# Patient Record
Sex: FEMALE | Race: ASIAN | NOT HISPANIC OR LATINO | URBAN - METROPOLITAN AREA
[De-identification: names, ages, dates, MRNs, and addresses within clinical notes are randomized per-mention and may not be internally consistent; named-entity substitution may affect disease eponyms.]

---

## 2021-07-09 ENCOUNTER — EMERGENCY (EMERGENCY)
Facility: HOSPITAL | Age: 86
LOS: 1 days | Discharge: HOME | End: 2021-07-09
Attending: EMERGENCY MEDICINE
Payer: MEDICARE

## 2021-07-09 VITALS
WEIGHT: 121.03 LBS | SYSTOLIC BLOOD PRESSURE: 113 MMHG | HEART RATE: 82 BPM | TEMPERATURE: 98 F | RESPIRATION RATE: 16 BRPM | HEIGHT: 59 IN | OXYGEN SATURATION: 96 % | DIASTOLIC BLOOD PRESSURE: 69 MMHG

## 2021-07-09 LAB
ABO RH CONFIRMATION: SIGNIFICANT CHANGE UP
ALBUMIN SERPL ELPH-MCNC: 2.7 G/DL — LOW (ref 3.5–5)
ALP SERPL-CCNC: 128 U/L — HIGH (ref 40–120)
ALT FLD-CCNC: 20 U/L DA — SIGNIFICANT CHANGE UP (ref 10–60)
ANION GAP SERPL CALC-SCNC: 5 MMOL/L — SIGNIFICANT CHANGE UP (ref 5–17)
ANISOCYTOSIS BLD QL: SIGNIFICANT CHANGE UP
APTT BLD: 34.3 SEC — SIGNIFICANT CHANGE UP (ref 27.5–35.5)
AST SERPL-CCNC: 49 U/L — HIGH (ref 10–40)
BASOPHILS # BLD AUTO: 0.07 K/UL — SIGNIFICANT CHANGE UP (ref 0–0.2)
BASOPHILS NFR BLD AUTO: 1.4 % — SIGNIFICANT CHANGE UP (ref 0–2)
BILIRUB SERPL-MCNC: 0.8 MG/DL — SIGNIFICANT CHANGE UP (ref 0.2–1.2)
BITE CELLS BLD QL SMEAR: PRESENT — SIGNIFICANT CHANGE UP
BLD GP AB SCN SERPL QL: SIGNIFICANT CHANGE UP
BUN SERPL-MCNC: 15 MG/DL — SIGNIFICANT CHANGE UP (ref 7–18)
BURR CELLS BLD QL SMEAR: SIGNIFICANT CHANGE UP
CALCIUM SERPL-MCNC: 7.9 MG/DL — LOW (ref 8.4–10.5)
CHLORIDE SERPL-SCNC: 103 MMOL/L — SIGNIFICANT CHANGE UP (ref 96–108)
CO2 SERPL-SCNC: 27 MMOL/L — SIGNIFICANT CHANGE UP (ref 22–31)
CREAT SERPL-MCNC: 1.28 MG/DL — SIGNIFICANT CHANGE UP (ref 0.5–1.3)
ELLIPTOCYTES BLD QL SMEAR: SLIGHT — SIGNIFICANT CHANGE UP
EOSINOPHIL # BLD AUTO: 0.43 K/UL — SIGNIFICANT CHANGE UP (ref 0–0.5)
EOSINOPHIL NFR BLD AUTO: 8.4 % — HIGH (ref 0–6)
GLUCOSE SERPL-MCNC: 106 MG/DL — HIGH (ref 70–99)
HCT VFR BLD CALC: 20.4 % — CRITICAL LOW (ref 34.5–45)
HGB BLD-MCNC: 5.8 G/DL — CRITICAL LOW (ref 11.5–15.5)
HYPOCHROMIA BLD QL: SIGNIFICANT CHANGE UP
IMM GRANULOCYTES NFR BLD AUTO: 0.2 % — SIGNIFICANT CHANGE UP (ref 0–1.5)
INR BLD: 1.22 RATIO — HIGH (ref 0.88–1.16)
LYMPHOCYTES # BLD AUTO: 1.37 K/UL — SIGNIFICANT CHANGE UP (ref 1–3.3)
LYMPHOCYTES # BLD AUTO: 26.8 % — SIGNIFICANT CHANGE UP (ref 13–44)
MACROCYTES BLD QL: SLIGHT — SIGNIFICANT CHANGE UP
MAGNESIUM SERPL-MCNC: 2.5 MG/DL — SIGNIFICANT CHANGE UP (ref 1.6–2.6)
MANUAL SMEAR VERIFICATION: SIGNIFICANT CHANGE UP
MCHC RBC-ENTMCNC: 17 PG — LOW (ref 27–34)
MCHC RBC-ENTMCNC: 28.4 GM/DL — LOW (ref 32–36)
MCV RBC AUTO: 59.8 FL — LOW (ref 80–100)
MICROCYTES BLD QL: SLIGHT — SIGNIFICANT CHANGE UP
MONOCYTES # BLD AUTO: 0.71 K/UL — SIGNIFICANT CHANGE UP (ref 0–0.9)
MONOCYTES NFR BLD AUTO: 13.9 % — SIGNIFICANT CHANGE UP (ref 2–14)
NEUTROPHILS # BLD AUTO: 2.53 K/UL — SIGNIFICANT CHANGE UP (ref 1.8–7.4)
NEUTROPHILS NFR BLD AUTO: 49.3 % — SIGNIFICANT CHANGE UP (ref 43–77)
NRBC # BLD: 0 /100 WBCS — SIGNIFICANT CHANGE UP (ref 0–0)
NT-PROBNP SERPL-SCNC: 622 PG/ML — HIGH (ref 0–450)
OB PNL STL: POSITIVE
OVALOCYTES BLD QL SMEAR: SLIGHT — SIGNIFICANT CHANGE UP
PLAT MORPH BLD: NORMAL — SIGNIFICANT CHANGE UP
PLATELET # BLD AUTO: 223 K/UL — SIGNIFICANT CHANGE UP (ref 150–400)
POIKILOCYTOSIS BLD QL AUTO: SIGNIFICANT CHANGE UP
POTASSIUM SERPL-MCNC: 4.7 MMOL/L — SIGNIFICANT CHANGE UP (ref 3.5–5.3)
POTASSIUM SERPL-SCNC: 4.7 MMOL/L — SIGNIFICANT CHANGE UP (ref 3.5–5.3)
PROT SERPL-MCNC: 6.8 G/DL — SIGNIFICANT CHANGE UP (ref 6–8.3)
PROTHROM AB SERPL-ACNC: 14.4 SEC — HIGH (ref 10.6–13.6)
RBC # BLD: 3.41 M/UL — LOW (ref 3.8–5.2)
RBC # FLD: 21.4 % — HIGH (ref 10.3–14.5)
RBC BLD AUTO: ABNORMAL
SCHISTOCYTES BLD QL AUTO: SLIGHT — SIGNIFICANT CHANGE UP
SODIUM SERPL-SCNC: 135 MMOL/L — SIGNIFICANT CHANGE UP (ref 135–145)
STOMATOCYTES BLD QL SMEAR: SLIGHT — SIGNIFICANT CHANGE UP
TARGETS BLD QL SMEAR: SLIGHT — SIGNIFICANT CHANGE UP
WBC # BLD: 5.12 K/UL — SIGNIFICANT CHANGE UP (ref 3.8–10.5)
WBC # FLD AUTO: 5.12 K/UL — SIGNIFICANT CHANGE UP (ref 3.8–10.5)

## 2021-07-09 PROCEDURE — 85025 COMPLETE CBC W/AUTO DIFF WBC: CPT

## 2021-07-09 PROCEDURE — 83880 ASSAY OF NATRIURETIC PEPTIDE: CPT

## 2021-07-09 PROCEDURE — 85730 THROMBOPLASTIN TIME PARTIAL: CPT

## 2021-07-09 PROCEDURE — 86850 RBC ANTIBODY SCREEN: CPT

## 2021-07-09 PROCEDURE — 85610 PROTHROMBIN TIME: CPT

## 2021-07-09 PROCEDURE — 86923 COMPATIBILITY TEST ELECTRIC: CPT

## 2021-07-09 PROCEDURE — 71045 X-RAY EXAM CHEST 1 VIEW: CPT

## 2021-07-09 PROCEDURE — 86900 BLOOD TYPING SEROLOGIC ABO: CPT

## 2021-07-09 PROCEDURE — 71045 X-RAY EXAM CHEST 1 VIEW: CPT | Mod: 26

## 2021-07-09 PROCEDURE — 86901 BLOOD TYPING SEROLOGIC RH(D): CPT

## 2021-07-09 PROCEDURE — 93005 ELECTROCARDIOGRAM TRACING: CPT

## 2021-07-09 PROCEDURE — P9040: CPT

## 2021-07-09 PROCEDURE — 36415 COLL VENOUS BLD VENIPUNCTURE: CPT

## 2021-07-09 PROCEDURE — 36430 TRANSFUSION BLD/BLD COMPNT: CPT

## 2021-07-09 PROCEDURE — 83735 ASSAY OF MAGNESIUM: CPT

## 2021-07-09 PROCEDURE — 96374 THER/PROPH/DIAG INJ IV PUSH: CPT

## 2021-07-09 PROCEDURE — 99284 EMERGENCY DEPT VISIT MOD MDM: CPT | Mod: 25

## 2021-07-09 PROCEDURE — 82272 OCCULT BLD FECES 1-3 TESTS: CPT

## 2021-07-09 PROCEDURE — 80053 COMPREHEN METABOLIC PANEL: CPT

## 2021-07-09 PROCEDURE — 99285 EMERGENCY DEPT VISIT HI MDM: CPT

## 2021-07-09 RX ORDER — FUROSEMIDE 40 MG
40 TABLET ORAL ONCE
Refills: 0 | Status: COMPLETED | OUTPATIENT
Start: 2021-07-09 | End: 2021-07-09

## 2021-07-09 RX ADMIN — Medication 40 MILLIGRAM(S): at 21:21

## 2021-07-09 NOTE — ED PROVIDER NOTE - ENMT, MLM
Airway patent, Nasal mucosa clear. Mouth with normal mucosa. Throat has no vesicles, no oropharyngeal exudates and uvula is midline.  [place]

## 2021-07-09 NOTE — ED PROVIDER NOTE - PROGRESS NOTE DETAILS
pt s/p 2 units blood transfusion.  Case d/w Dr. Matthews, even though pt with guaiac positive, 2/2 age, no further w/u warranted.  Will d/c back to NH pt suddenly with coughing, mild wheezing, given neb treatment.  wheezing much improved.  Pt's d/c to NH

## 2021-07-09 NOTE — ED PROVIDER NOTE - PMH
COPD (chronic obstructive pulmonary disease)    Heart failure    HTN (hypertension)    Hypothyroid

## 2021-07-09 NOTE — ED ADULT TRIAGE NOTE - RESPIRATORY RATE (BREATHS/MIN)
16 Solaraze Pregnancy And Lactation Text: This medication is Pregnancy Category B and is considered safe. There is some data to suggest avoiding during the third trimester. It is unknown if this medication is excreted in breast milk.

## 2021-07-09 NOTE — ED PROVIDER NOTE - OBJECTIVE STATEMENT
89 y.o. NH female BIBA reportedly with Hgb 5.8 89 y.o. NH female BIBA reportedly with Hgb 5.6, Pt's able to ambulate with a walker, c/o feeling tired, myalgia, mild dec. appetite, leg edema,sob for a while, denies fever, coughing, cp.  Pt completed COVID vaccine.

## 2021-07-09 NOTE — ED ADULT NURSE NOTE - NSIMPLEMENTINTERV_GEN_ALL_ED
Implemented All Fall with Harm Risk Interventions:  [place] to call system. Call bell, personal items and telephone within reach. Instruct patient to call for assistance. Room bathroom lighting operational. Non-slip footwear when patient is off stretcher. Physically safe environment: no spills, clutter or unnecessary equipment. Stretcher in lowest position, wheels locked, appropriate side rails in place. Provide visual cue, wrist band, yellow gown, etc. Monitor gait and stability. Monitor for mental status changes and reorient to person, place, and time. Review medications for side effects contributing to fall risk. Reinforce activity limits and safety measures with patient and family. Provide visual clues: red socks.

## 2021-07-09 NOTE — ED PROVIDER NOTE - PATIENT PORTAL LINK FT
You can access the FollowMyHealth Patient Portal offered by [facility] by registering at the following website: [email or web address]. By joining [employer]’s FollowMyHealth portal, you will also be able to view your health information using other applications (apps) compatible with our system.

## 2021-07-10 VITALS
RESPIRATION RATE: 18 BRPM | TEMPERATURE: 98 F | OXYGEN SATURATION: 97 % | DIASTOLIC BLOOD PRESSURE: 63 MMHG | HEART RATE: 80 BPM | SYSTOLIC BLOOD PRESSURE: 115 MMHG

## 2021-07-10 RX ORDER — IPRATROPIUM/ALBUTEROL SULFATE 18-103MCG
3 AEROSOL WITH ADAPTER (GRAM) INHALATION
Refills: 0 | Status: DISCONTINUED | OUTPATIENT
Start: 2021-07-10 | End: 2021-07-13

## 2022-01-28 ENCOUNTER — INPATIENT (INPATIENT)
Facility: HOSPITAL | Age: 87
LOS: 4 days | Discharge: NURSING, REHAB OR LONG-TERM CARE | DRG: 522 | End: 2022-02-02
Attending: INTERNAL MEDICINE | Admitting: INTERNAL MEDICINE
Payer: MEDICARE

## 2022-01-28 VITALS
HEIGHT: 59 IN | HEART RATE: 74 BPM | DIASTOLIC BLOOD PRESSURE: 67 MMHG | SYSTOLIC BLOOD PRESSURE: 118 MMHG | OXYGEN SATURATION: 100 % | RESPIRATION RATE: 16 BRPM | TEMPERATURE: 98 F | WEIGHT: 119.93 LBS

## 2022-01-28 DIAGNOSIS — I50.9 HEART FAILURE, UNSPECIFIED: ICD-10-CM

## 2022-01-28 DIAGNOSIS — D64.9 ANEMIA, UNSPECIFIED: ICD-10-CM

## 2022-01-28 DIAGNOSIS — Z29.9 ENCOUNTER FOR PROPHYLACTIC MEASURES, UNSPECIFIED: ICD-10-CM

## 2022-01-28 DIAGNOSIS — S72.002A FRACTURE OF UNSPECIFIED PART OF NECK OF LEFT FEMUR, INITIAL ENCOUNTER FOR CLOSED FRACTURE: ICD-10-CM

## 2022-01-28 DIAGNOSIS — E11.9 TYPE 2 DIABETES MELLITUS WITHOUT COMPLICATIONS: ICD-10-CM

## 2022-01-28 DIAGNOSIS — M25.552 PAIN IN LEFT HIP: ICD-10-CM

## 2022-01-28 DIAGNOSIS — Z51.5 ENCOUNTER FOR PALLIATIVE CARE: ICD-10-CM

## 2022-01-28 DIAGNOSIS — E03.9 HYPOTHYROIDISM, UNSPECIFIED: ICD-10-CM

## 2022-01-28 DIAGNOSIS — I10 ESSENTIAL (PRIMARY) HYPERTENSION: ICD-10-CM

## 2022-01-28 PROBLEM — J44.9 CHRONIC OBSTRUCTIVE PULMONARY DISEASE, UNSPECIFIED: Chronic | Status: ACTIVE | Noted: 2021-07-09

## 2022-01-28 LAB
ALBUMIN SERPL ELPH-MCNC: 2.1 G/DL — LOW (ref 3.5–5)
ALBUMIN SERPL ELPH-MCNC: 2.5 G/DL — LOW (ref 3.5–5)
ALP SERPL-CCNC: 115 U/L — SIGNIFICANT CHANGE UP (ref 40–120)
ALP SERPL-CCNC: 117 U/L — SIGNIFICANT CHANGE UP (ref 40–120)
ALT FLD-CCNC: 25 U/L DA — SIGNIFICANT CHANGE UP (ref 10–60)
ALT FLD-CCNC: 26 U/L DA — SIGNIFICANT CHANGE UP (ref 10–60)
ANION GAP SERPL CALC-SCNC: 4 MMOL/L — LOW (ref 5–17)
ANION GAP SERPL CALC-SCNC: 4 MMOL/L — LOW (ref 5–17)
APPEARANCE UR: CLEAR — SIGNIFICANT CHANGE UP
APTT BLD: 35.9 SEC — HIGH (ref 27.5–35.5)
AST SERPL-CCNC: 28 U/L — SIGNIFICANT CHANGE UP (ref 10–40)
AST SERPL-CCNC: 37 U/L — SIGNIFICANT CHANGE UP (ref 10–40)
BASOPHILS # BLD AUTO: 0.06 K/UL — SIGNIFICANT CHANGE UP (ref 0–0.2)
BASOPHILS NFR BLD AUTO: 1.1 % — SIGNIFICANT CHANGE UP (ref 0–2)
BILIRUB SERPL-MCNC: 0.7 MG/DL — SIGNIFICANT CHANGE UP (ref 0.2–1.2)
BILIRUB SERPL-MCNC: 0.7 MG/DL — SIGNIFICANT CHANGE UP (ref 0.2–1.2)
BILIRUB UR-MCNC: NEGATIVE — SIGNIFICANT CHANGE UP
BUN SERPL-MCNC: 24 MG/DL — HIGH (ref 7–18)
BUN SERPL-MCNC: 25 MG/DL — HIGH (ref 7–18)
CALCIUM SERPL-MCNC: 7.9 MG/DL — LOW (ref 8.4–10.5)
CALCIUM SERPL-MCNC: 8.3 MG/DL — LOW (ref 8.4–10.5)
CHLORIDE SERPL-SCNC: 106 MMOL/L — SIGNIFICANT CHANGE UP (ref 96–108)
CHLORIDE SERPL-SCNC: 106 MMOL/L — SIGNIFICANT CHANGE UP (ref 96–108)
CO2 SERPL-SCNC: 27 MMOL/L — SIGNIFICANT CHANGE UP (ref 22–31)
CO2 SERPL-SCNC: 27 MMOL/L — SIGNIFICANT CHANGE UP (ref 22–31)
COLOR SPEC: YELLOW — SIGNIFICANT CHANGE UP
CREAT SERPL-MCNC: 1.2 MG/DL — SIGNIFICANT CHANGE UP (ref 0.5–1.3)
CREAT SERPL-MCNC: 1.37 MG/DL — HIGH (ref 0.5–1.3)
DIFF PNL FLD: ABNORMAL
EOSINOPHIL # BLD AUTO: 0.21 K/UL — SIGNIFICANT CHANGE UP (ref 0–0.5)
EOSINOPHIL NFR BLD AUTO: 3.9 % — SIGNIFICANT CHANGE UP (ref 0–6)
FERRITIN SERPL-MCNC: 19 NG/ML — SIGNIFICANT CHANGE UP (ref 15–150)
GLUCOSE BLDC GLUCOMTR-MCNC: 127 MG/DL — HIGH (ref 70–99)
GLUCOSE BLDC GLUCOMTR-MCNC: 137 MG/DL — HIGH (ref 70–99)
GLUCOSE BLDC GLUCOMTR-MCNC: 174 MG/DL — HIGH (ref 70–99)
GLUCOSE SERPL-MCNC: 104 MG/DL — HIGH (ref 70–99)
GLUCOSE SERPL-MCNC: 134 MG/DL — HIGH (ref 70–99)
GLUCOSE UR QL: NEGATIVE — SIGNIFICANT CHANGE UP
HCT VFR BLD CALC: 22.1 % — LOW (ref 34.5–45)
HCT VFR BLD CALC: 23.1 % — LOW (ref 34.5–45)
HGB BLD-MCNC: 6.4 G/DL — CRITICAL LOW (ref 11.5–15.5)
HGB BLD-MCNC: 6.7 G/DL — CRITICAL LOW (ref 11.5–15.5)
IMM GRANULOCYTES NFR BLD AUTO: 0.2 % — SIGNIFICANT CHANGE UP (ref 0–1.5)
INR BLD: 1.1 RATIO — SIGNIFICANT CHANGE UP (ref 0.88–1.16)
IRON SATN MFR SERPL: 12 UG/DL — LOW (ref 40–150)
IRON SATN MFR SERPL: 3 % — LOW (ref 15–50)
KETONES UR-MCNC: NEGATIVE — SIGNIFICANT CHANGE UP
LEUKOCYTE ESTERASE UR-ACNC: NEGATIVE — SIGNIFICANT CHANGE UP
LYMPHOCYTES # BLD AUTO: 0.89 K/UL — LOW (ref 1–3.3)
LYMPHOCYTES # BLD AUTO: 16.6 % — SIGNIFICANT CHANGE UP (ref 13–44)
MAGNESIUM SERPL-MCNC: 2.3 MG/DL — SIGNIFICANT CHANGE UP (ref 1.6–2.6)
MAGNESIUM SERPL-MCNC: 2.4 MG/DL — SIGNIFICANT CHANGE UP (ref 1.6–2.6)
MCHC RBC-ENTMCNC: 19.5 PG — LOW (ref 27–34)
MCHC RBC-ENTMCNC: 19.8 PG — LOW (ref 27–34)
MCHC RBC-ENTMCNC: 29 GM/DL — LOW (ref 32–36)
MCHC RBC-ENTMCNC: 29 GM/DL — LOW (ref 32–36)
MCV RBC AUTO: 67.2 FL — LOW (ref 80–100)
MCV RBC AUTO: 68.1 FL — LOW (ref 80–100)
MONOCYTES # BLD AUTO: 0.59 K/UL — SIGNIFICANT CHANGE UP (ref 0–0.9)
MONOCYTES NFR BLD AUTO: 11 % — SIGNIFICANT CHANGE UP (ref 2–14)
NEUTROPHILS # BLD AUTO: 3.61 K/UL — SIGNIFICANT CHANGE UP (ref 1.8–7.4)
NEUTROPHILS NFR BLD AUTO: 67.2 % — SIGNIFICANT CHANGE UP (ref 43–77)
NITRITE UR-MCNC: POSITIVE
NRBC # BLD: 0 /100 WBCS — SIGNIFICANT CHANGE UP (ref 0–0)
NRBC # BLD: 0 /100 WBCS — SIGNIFICANT CHANGE UP (ref 0–0)
NT-PROBNP SERPL-SCNC: 685 PG/ML — HIGH (ref 0–450)
PH UR: 5 — SIGNIFICANT CHANGE UP (ref 5–8)
PHOSPHATE SERPL-MCNC: 3.9 MG/DL — SIGNIFICANT CHANGE UP (ref 2.5–4.5)
PLATELET # BLD AUTO: 215 K/UL — SIGNIFICANT CHANGE UP (ref 150–400)
PLATELET # BLD AUTO: 229 K/UL — SIGNIFICANT CHANGE UP (ref 150–400)
POTASSIUM SERPL-MCNC: 4.2 MMOL/L — SIGNIFICANT CHANGE UP (ref 3.5–5.3)
POTASSIUM SERPL-MCNC: 4.4 MMOL/L — SIGNIFICANT CHANGE UP (ref 3.5–5.3)
POTASSIUM SERPL-SCNC: 4.2 MMOL/L — SIGNIFICANT CHANGE UP (ref 3.5–5.3)
POTASSIUM SERPL-SCNC: 4.4 MMOL/L — SIGNIFICANT CHANGE UP (ref 3.5–5.3)
PROT SERPL-MCNC: 6.6 G/DL — SIGNIFICANT CHANGE UP (ref 6–8.3)
PROT SERPL-MCNC: 6.9 G/DL — SIGNIFICANT CHANGE UP (ref 6–8.3)
PROT UR-MCNC: 15
PROTHROM AB SERPL-ACNC: 13 SEC — SIGNIFICANT CHANGE UP (ref 10.6–13.6)
RBC # BLD: 3.29 M/UL — LOW (ref 3.8–5.2)
RBC # BLD: 3.39 M/UL — LOW (ref 3.8–5.2)
RBC # FLD: 18 % — HIGH (ref 10.3–14.5)
RBC # FLD: 18.2 % — HIGH (ref 10.3–14.5)
SARS-COV-2 RNA SPEC QL NAA+PROBE: SIGNIFICANT CHANGE UP
SODIUM SERPL-SCNC: 137 MMOL/L — SIGNIFICANT CHANGE UP (ref 135–145)
SODIUM SERPL-SCNC: 137 MMOL/L — SIGNIFICANT CHANGE UP (ref 135–145)
SP GR SPEC: 1.02 — SIGNIFICANT CHANGE UP (ref 1.01–1.02)
T4 FREE SERPL-MCNC: 1 NG/DL — SIGNIFICANT CHANGE UP (ref 0.9–1.8)
TIBC SERPL-MCNC: 365 UG/DL — SIGNIFICANT CHANGE UP (ref 250–450)
TROPONIN I, HIGH SENSITIVITY RESULT: 20.3 NG/L — SIGNIFICANT CHANGE UP
TSH SERPL-MCNC: 8.11 UU/ML — HIGH (ref 0.34–4.82)
UIBC SERPL-MCNC: 353 UG/DL — SIGNIFICANT CHANGE UP (ref 110–370)
UROBILINOGEN FLD QL: NEGATIVE — SIGNIFICANT CHANGE UP
WBC # BLD: 5.37 K/UL — SIGNIFICANT CHANGE UP (ref 3.8–10.5)
WBC # BLD: 5.64 K/UL — SIGNIFICANT CHANGE UP (ref 3.8–10.5)
WBC # FLD AUTO: 5.37 K/UL — SIGNIFICANT CHANGE UP (ref 3.8–10.5)
WBC # FLD AUTO: 5.64 K/UL — SIGNIFICANT CHANGE UP (ref 3.8–10.5)

## 2022-01-28 PROCEDURE — 93010 ELECTROCARDIOGRAM REPORT: CPT

## 2022-01-28 PROCEDURE — 73502 X-RAY EXAM HIP UNI 2-3 VIEWS: CPT | Mod: 26,LT

## 2022-01-28 PROCEDURE — 71045 X-RAY EXAM CHEST 1 VIEW: CPT | Mod: 26

## 2022-01-28 PROCEDURE — 99222 1ST HOSP IP/OBS MODERATE 55: CPT

## 2022-01-28 PROCEDURE — 99223 1ST HOSP IP/OBS HIGH 75: CPT

## 2022-01-28 PROCEDURE — 99285 EMERGENCY DEPT VISIT HI MDM: CPT

## 2022-01-28 PROCEDURE — 99497 ADVNCD CARE PLAN 30 MIN: CPT | Mod: 25

## 2022-01-28 RX ORDER — METOPROLOL TARTRATE 50 MG
1 TABLET ORAL
Qty: 0 | Refills: 0 | DISCHARGE

## 2022-01-28 RX ORDER — LEVOTHYROXINE SODIUM 125 MCG
25 TABLET ORAL DAILY
Refills: 0 | Status: DISCONTINUED | OUTPATIENT
Start: 2022-01-28 | End: 2022-01-31

## 2022-01-28 RX ORDER — HEPARIN SODIUM 5000 [USP'U]/ML
5000 INJECTION INTRAVENOUS; SUBCUTANEOUS EVERY 8 HOURS
Refills: 0 | Status: DISCONTINUED | OUTPATIENT
Start: 2022-01-28 | End: 2022-01-28

## 2022-01-28 RX ORDER — BUDESONIDE AND FORMOTEROL FUMARATE DIHYDRATE 160; 4.5 UG/1; UG/1
2 AEROSOL RESPIRATORY (INHALATION)
Qty: 0 | Refills: 0 | DISCHARGE

## 2022-01-28 RX ORDER — MORPHINE SULFATE 50 MG/1
4 CAPSULE, EXTENDED RELEASE ORAL ONCE
Refills: 0 | Status: DISCONTINUED | OUTPATIENT
Start: 2022-01-28 | End: 2022-01-28

## 2022-01-28 RX ORDER — TRAMADOL HYDROCHLORIDE 50 MG/1
50 TABLET ORAL THREE TIMES A DAY
Refills: 0 | Status: DISCONTINUED | OUTPATIENT
Start: 2022-01-28 | End: 2022-01-28

## 2022-01-28 RX ORDER — IPRATROPIUM/ALBUTEROL SULFATE 18-103MCG
3 AEROSOL WITH ADAPTER (GRAM) INHALATION ONCE
Refills: 0 | Status: COMPLETED | OUTPATIENT
Start: 2022-01-28 | End: 2022-01-28

## 2022-01-28 RX ORDER — AMLODIPINE BESYLATE 2.5 MG/1
1 TABLET ORAL
Qty: 0 | Refills: 0 | DISCHARGE

## 2022-01-28 RX ORDER — MIRTAZAPINE 45 MG/1
0.5 TABLET, ORALLY DISINTEGRATING ORAL
Qty: 0 | Refills: 0 | DISCHARGE

## 2022-01-28 RX ORDER — AMLODIPINE BESYLATE 2.5 MG/1
5 TABLET ORAL DAILY
Refills: 0 | Status: DISCONTINUED | OUTPATIENT
Start: 2022-01-28 | End: 2022-01-31

## 2022-01-28 RX ORDER — FUROSEMIDE 40 MG
1 TABLET ORAL
Qty: 0 | Refills: 0 | DISCHARGE

## 2022-01-28 RX ORDER — OXYCODONE HYDROCHLORIDE 5 MG/1
2.5 TABLET ORAL EVERY 4 HOURS
Refills: 0 | Status: DISCONTINUED | OUTPATIENT
Start: 2022-01-28 | End: 2022-01-31

## 2022-01-28 RX ORDER — MIRTAZAPINE 45 MG/1
7.5 TABLET, ORALLY DISINTEGRATING ORAL AT BEDTIME
Refills: 0 | Status: DISCONTINUED | OUTPATIENT
Start: 2022-01-28 | End: 2022-01-31

## 2022-01-28 RX ORDER — FUROSEMIDE 40 MG
40 TABLET ORAL
Refills: 0 | Status: DISCONTINUED | OUTPATIENT
Start: 2022-01-28 | End: 2022-01-31

## 2022-01-28 RX ORDER — FERROUS SULFATE 325(65) MG
5 TABLET ORAL
Qty: 0 | Refills: 0 | DISCHARGE

## 2022-01-28 RX ORDER — FERROUS SULFATE 325(65) MG
300 TABLET ORAL EVERY 12 HOURS
Refills: 0 | Status: DISCONTINUED | OUTPATIENT
Start: 2022-01-28 | End: 2022-01-31

## 2022-01-28 RX ORDER — OXYCODONE HYDROCHLORIDE 5 MG/1
5 TABLET ORAL EVERY 4 HOURS
Refills: 0 | Status: DISCONTINUED | OUTPATIENT
Start: 2022-01-28 | End: 2022-01-31

## 2022-01-28 RX ORDER — SENNA PLUS 8.6 MG/1
2 TABLET ORAL AT BEDTIME
Refills: 0 | Status: DISCONTINUED | OUTPATIENT
Start: 2022-01-28 | End: 2022-01-31

## 2022-01-28 RX ORDER — HEPARIN SODIUM 5000 [USP'U]/ML
5000 INJECTION INTRAVENOUS; SUBCUTANEOUS EVERY 8 HOURS
Refills: 0 | Status: DISCONTINUED | OUTPATIENT
Start: 2022-01-28 | End: 2022-01-31

## 2022-01-28 RX ORDER — BUDESONIDE AND FORMOTEROL FUMARATE DIHYDRATE 160; 4.5 UG/1; UG/1
2 AEROSOL RESPIRATORY (INHALATION)
Refills: 0 | Status: DISCONTINUED | OUTPATIENT
Start: 2022-01-28 | End: 2022-01-31

## 2022-01-28 RX ORDER — ACETAMINOPHEN 500 MG
1000 TABLET ORAL EVERY 8 HOURS
Refills: 0 | Status: DISCONTINUED | OUTPATIENT
Start: 2022-01-28 | End: 2022-01-31

## 2022-01-28 RX ORDER — ACETAMINOPHEN 500 MG
650 TABLET ORAL EVERY 6 HOURS
Refills: 0 | Status: DISCONTINUED | OUTPATIENT
Start: 2022-01-28 | End: 2022-01-28

## 2022-01-28 RX ORDER — METOPROLOL TARTRATE 50 MG
25 TABLET ORAL
Refills: 0 | Status: DISCONTINUED | OUTPATIENT
Start: 2022-01-28 | End: 2022-01-31

## 2022-01-28 RX ORDER — INSULIN LISPRO 100/ML
VIAL (ML) SUBCUTANEOUS EVERY 6 HOURS
Refills: 0 | Status: DISCONTINUED | OUTPATIENT
Start: 2022-01-28 | End: 2022-01-31

## 2022-01-28 RX ORDER — CEFTRIAXONE 500 MG/1
INJECTION, POWDER, FOR SOLUTION INTRAMUSCULAR; INTRAVENOUS
Refills: 0 | Status: COMPLETED | OUTPATIENT
Start: 2022-01-28 | End: 2022-02-01

## 2022-01-28 RX ORDER — CEFTRIAXONE 500 MG/1
1000 INJECTION, POWDER, FOR SOLUTION INTRAMUSCULAR; INTRAVENOUS ONCE
Refills: 0 | Status: COMPLETED | OUTPATIENT
Start: 2022-01-28 | End: 2022-01-28

## 2022-01-28 RX ORDER — LEVOTHYROXINE SODIUM 125 MCG
1 TABLET ORAL
Qty: 0 | Refills: 0 | DISCHARGE

## 2022-01-28 RX ORDER — CEFTRIAXONE 500 MG/1
1000 INJECTION, POWDER, FOR SOLUTION INTRAMUSCULAR; INTRAVENOUS EVERY 24 HOURS
Refills: 0 | Status: COMPLETED | OUTPATIENT
Start: 2022-01-29 | End: 2022-01-31

## 2022-01-28 RX ORDER — SENNA PLUS 8.6 MG/1
2 TABLET ORAL
Qty: 0 | Refills: 0 | DISCHARGE

## 2022-01-28 RX ADMIN — Medication 1: at 11:50

## 2022-01-28 RX ADMIN — MORPHINE SULFATE 4 MILLIGRAM(S): 50 CAPSULE, EXTENDED RELEASE ORAL at 01:38

## 2022-01-28 RX ADMIN — MIRTAZAPINE 7.5 MILLIGRAM(S): 45 TABLET, ORALLY DISINTEGRATING ORAL at 21:13

## 2022-01-28 RX ADMIN — Medication 1000 MILLIGRAM(S): at 22:24

## 2022-01-28 RX ADMIN — BUDESONIDE AND FORMOTEROL FUMARATE DIHYDRATE 2 PUFF(S): 160; 4.5 AEROSOL RESPIRATORY (INHALATION) at 21:30

## 2022-01-28 RX ADMIN — Medication 3 MILLILITER(S): at 01:30

## 2022-01-28 RX ADMIN — MORPHINE SULFATE 4 MILLIGRAM(S): 50 CAPSULE, EXTENDED RELEASE ORAL at 02:42

## 2022-01-28 RX ADMIN — Medication 125 MILLIGRAM(S): at 01:38

## 2022-01-28 RX ADMIN — MORPHINE SULFATE 4 MILLIGRAM(S): 50 CAPSULE, EXTENDED RELEASE ORAL at 04:19

## 2022-01-28 RX ADMIN — Medication 3 MILLILITER(S): at 01:40

## 2022-01-28 RX ADMIN — CEFTRIAXONE 1000 MILLIGRAM(S): 500 INJECTION, POWDER, FOR SOLUTION INTRAMUSCULAR; INTRAVENOUS at 12:57

## 2022-01-28 RX ADMIN — Medication 1000 MILLIGRAM(S): at 21:28

## 2022-01-28 RX ADMIN — BUDESONIDE AND FORMOTEROL FUMARATE DIHYDRATE 2 PUFF(S): 160; 4.5 AEROSOL RESPIRATORY (INHALATION) at 09:03

## 2022-01-28 NOTE — ED PROVIDER NOTE - OBJECTIVE STATEMENT
89 year old female PMH COPD, CHF, HTN, hypothyroid coming in s/p fall with left hip pain. pt states she was going to the bathroom and felt dizzy and fell onto her left side. denies head trauma or LOC. states she chronically feels dizzy. denies all other complaints.

## 2022-01-28 NOTE — ED PROVIDER NOTE - PHYSICAL EXAMINATION
LLE shortened and externally rotated. ttp over left hip.  b/l UE full ROM, nontender, no skin changes.  no midline ttp neck or back  head is atraumatic  RLE full ROM, nontender.  b/l UE and LE N/V intact.

## 2022-01-28 NOTE — ED ADULT NURSE NOTE - OBJECTIVE STATEMENT
Patient brought in to the ED by EMS from [facility] s/p fall. Pt A&OX3 with difficulty hearing, c/o left hip and left sided pain. Pitting edema noted to B/L lower extremities. Patient noted to wheezing and oxygen in use. Dark/redness or stage I noted to Right buttock.

## 2022-01-28 NOTE — CONSULT NOTE ADULT - PROBLEM SELECTOR RECOMMENDATION 9
Pt with left hip pain s/p fall.  + left hip fracture.  Pt with chronic anemia - pt refusing transfusion.  Family and pt would like comfort care and no surgical intervention.  High risk medications reviewed. Avoid polypharmacy. Avoid IV opioids. Avoid NSAIDs and benzodiazepines. Non-pharmacological sleep aides initiated. Non-opioid medications and non-pharmacological pain management measures initiated.  Mild pain - pain level 1-3  nonpharmacological measures  ice packs, heat packs, PT/OOB, guided imagery, distraction   Nonopioid recc  - Acetaminophen 1 gram po q 8 hours atc for 3 days   - Lidocaine 4% patch daily   Opioid Recc  - oxycodone 2.5mg po q 4 hours prn moderate pain      - oxycodone 5mg po q 4 hours prn severe pain   Bowel Regimen  - senna   OOB/Pt  + anemia - continue ferrous sulfate

## 2022-01-28 NOTE — CONSULT NOTE ADULT - TREATMENT GUIDELINES
DNR Order/Comfort measures only/Do not re-hospitalize/No artificial nutrition/No antibiotics/Artificial nutrition trial/No IV fluids

## 2022-01-28 NOTE — H&P ADULT - PROBLEM SELECTOR PLAN 5
continue home medication On levothyroxine 25 at home  TSH  8.11   Follow free T4 and total T3  Continue home med

## 2022-01-28 NOTE — H&P ADULT - PROBLEM SELECTOR PLAN 2
Chronic anemia  Pt refusing blood transfusion   Hb 6.4  Follow anemia panel   H/H Q6  Monitor for signs of bleeding Chronic anemia  Pt refusing blood transfusion   Hb 6.4  Follow anemia panel   H/H Q6  Monitor for signs of bleeding  Contact family if Hb continues to drop for possible transfusion

## 2022-01-28 NOTE — CONSULT NOTE ADULT - ATTENDING COMMENTS
89 y F hx chronic anemia req intermittent transfusions, COPD, CHF adm w hip fx s/p fall at NH. Patient indicating she would not want surgery or further transfusions. Family discussing w patient. Pain control w acetaminophen, oxycodone prn. Was being evaluated for hospice services prior to admission. Echo showed mod diastolic dysfunction, severe AS w pEF, severe pulm HTN. DNR/DNI/DNH, no feeding tubes per pt wishes. Palliative will follow.

## 2022-01-28 NOTE — CONSULT NOTE ADULT - PROBLEM SELECTOR RECOMMENDATION 9
-NO surgical intervention per pt  -comfort measures only  -interested in hospice, apparently was in midst of VNS eval PTA and palliative SW to f/u on this   -pain management following -NO surgical intervention per pt  -comfort measures only  -interested in hospice, apparently was in midst of VNS eval PTA and palliative SW to f/u on this   -pain management following    Discussed with primary team. -NO surgical intervention per pt  -comfort measures only  -wishes for hospice, apparently was accepted and pending VNS admission at [facility] PTA   -pain management following  -dw Social Work and primary team

## 2022-01-28 NOTE — CONSULT NOTE ADULT - PROBLEM SELECTOR RECOMMENDATION 3
-TTE done today showing preserved EF with severe diastolic dysfunction, severe pulmonary htn and severe aortic stenosis  -not a candidate for cardiac intervention, and not within GOC  -plan is for hospice at [facility]

## 2022-01-28 NOTE — ADVANCED PRACTICE NURSE CONSULT - ASSESSMENT
This is a 89yr old female patient admitted for L. Femur Fracture, presenting with a Stage 1 Pressure Injury to the Coccyx and Bilateral Heels, as evident by non-blanchable erythema

## 2022-01-28 NOTE — H&P ADULT - NSHPPHYSICALEXAM_GEN_ALL_CORE
LOS:     VITALS:   T(C): 36.5 (01-28-22 @ 04:41), Max: 36.7 (01-28-22 @ 01:06)  HR: 91 (01-28-22 @ 04:41) (74 - 91)  BP: 127/73 (01-28-22 @ 04:41) (118/67 - 127/73)  RR: 20 (01-28-22 @ 04:41) (16 - 20)  SpO2: 100% (01-28-22 @ 04:41) (100% - 100%)    GENERAL: NAD, lying in bed comfortably  HEAD:  Atraumatic, Normocephalic  EYES: EOMI, PERRLA, conjunctiva and sclera clear  ENT: Moist mucous membranes  NECK: Supple, No JVD  CHEST/LUNG: wheezing   HEART: Regular rate and rhythm; No murmurs, rubs, or gallops  ABDOMEN: BSx4; Soft, nontender, nondistended  EXTREMITIES:  ROM limited on left hip 2/2 pain. 2+ Peripheral Pulses, brisk capillary refill. No clubbing, cyanosis, or edema  NERVOUS SYSTEM:  unable to assess mental status due to language barrier   SKIN: No rashes or lesions

## 2022-01-28 NOTE — CONSULT NOTE ADULT - SUBJECTIVE AND OBJECTIVE BOX
HPI:  89 year old female PMH COPD, CHF, HTN, hypothyroid coming in s/p fall with left hip pain. Primary team called daughter, Jamila, who stated she was told her mother fell in the bathroom at the NH for which she was brought to the ED. She states her mother has chronic anemia and does not want to prolong her suffering so refuses blood transfusions. She is tired of living in pain and has made it clear to family that she wants to be DNR/DNI. Molst form in NH papers.  (28 Jan 2022 03:37)    as per ED attending pt Stable. labs with anemia. xrays with +left hip fx. He spoke with pts daughter hilary- pt has been refusing blood transfusions recently so at this time no blood transfusion. Just today the family was having conversations about possibly hospice/comfort care to prevent suffering. Daughter unsure if they would want hip surgery or not- wants to speak with remainder of family to officially decide.  PAST MEDICAL & SURGICAL HISTORY:  HTN (hypertension)    Hypothyroid    COPD (chronic obstructive pulmonary disease)    Heart failure        Vital Signs Last 24 Hrs  T(C): 36.4 (28 Jan 2022 06:01), Max: 36.7 (28 Jan 2022 01:06)  T(F): 97.6 (28 Jan 2022 06:01), Max: 98 (28 Jan 2022 01:06)  HR: 100 (28 Jan 2022 06:01) (74 - 100)  BP: 130/69 (28 Jan 2022 06:01) (118/67 - 130/69)  BP(mean): --  RR: 20 (28 Jan 2022 06:01) (16 - 20)  SpO2: 100% (28 Jan 2022 06:01) (100% - 100%)                          6.4    5.37  )-----------( 229      ( 28 Jan 2022 01:43 )             22.1     01-28    137  |  106  |  24<H>  ----------------------------<  104<H>  4.4   |  27  |  1.20    Ca    7.9<L>      28 Jan 2022 01:43  Mg     2.3     01-28    TPro  6.6  /  Alb  2.1<L>  /  TBili  0.7  /  DBili  x   /  AST  37  /  ALT  25  /  AlkPhos  117  01-28    PT/INR - ( 28 Jan 2022 01:43 )   PT: 13.0 sec;   INR: 1.10 ratio         PTT - ( 28 Jan 2022 01:43 )  PTT:35.9 sec    PHYSICAL EXAM  General: WN/WD NAD  Neurology: A&Ox3, nonfocal, STARR x 4  Respiratory: CTA B/L  CV: RRR, S1S2, no murmurs, rubs or gallops  Abdominal: Soft, NT, ND +BS, Last BM  Extremities: No edema, + peripheral pulses        ASSESSMENT/ PLAN:   HPI:  89 year old female PMH COPD, CHF, HTN, hypothyroid coming in s/p fall with left hip pain. Primary team called daughter, Jamila, who stated she was told her mother fell in the bathroom at the NH for which she was brought to the ED. She states her mother has chronic anemia and does not want to prolong her suffering so refuses blood transfusions. She is tired of living in pain and has made it clear to family that she wants to be DNR/DNI. Molst form in NH papers.  (28 Jan 2022 03:37)    as per ED attending pt Stable. labs with anemia. xrays with +left hip fx. He spoke with pts daughter hilary- pt has been refusing blood transfusions recently so at this time no blood transfusion. Just today the family was having conversations about possibly hospice/comfort care to prevent suffering. Daughter unsure if they would want hip surgery or not- wants to speak with remainder of family to officially decide.  PAST MEDICAL & SURGICAL HISTORY:  HTN (hypertension)    Hypothyroid    COPD (chronic obstructive pulmonary disease)    Heart failure        Vital Signs Last 24 Hrs  T(C): 36.4 (28 Jan 2022 06:01), Max: 36.7 (28 Jan 2022 01:06)  T(F): 97.6 (28 Jan 2022 06:01), Max: 98 (28 Jan 2022 01:06)  HR: 100 (28 Jan 2022 06:01) (74 - 100)  BP: 130/69 (28 Jan 2022 06:01) (118/67 - 130/69)  BP(mean): --  RR: 20 (28 Jan 2022 06:01) (16 - 20)  SpO2: 100% (28 Jan 2022 06:01) (100% - 100%)                          6.4    5.37  )-----------( 229      ( 28 Jan 2022 01:43 )             22.1     01-28    137  |  106  |  24<H>  ----------------------------<  104<H>  4.4   |  27  |  1.20    Ca    7.9<L>      28 Jan 2022 01:43  Mg     2.3     01-28    TPro  6.6  /  Alb  2.1<L>  /  TBili  0.7  /  DBili  x   /  AST  37  /  ALT  25  /  AlkPhos  117  01-28    PT/INR - ( 28 Jan 2022 01:43 )   PT: 13.0 sec;   INR: 1.10 ratio         PTT - ( 28 Jan 2022 01:43 )  PTT:35.9 sec    PHYSICAL EXAM  General: WN/WD NAD  Neurology: Awake, non verbal, alert  Respiratory: good b/l air entry, wheezing  CV: S1S2  Abdominal: Soft, NT, ND   Extremities: b/l edema, + distal pulses by doppler  LLE warm, L hip pain        ASSESSMENT/ PLAN:  89 y/o female with L hip fx s/p fall at [facility] med comorb  DNR/DNI  refusing blood tx  admitted med service [facility] hip fx  pain control,  med clearance/optimization  family was having conversations about possibly hospice/comfort care to prevent suffering. Daughter unsure if they would want hip surgery or not- wants to speak with remainder of family to officially decide.  d/w Dr Wright

## 2022-01-28 NOTE — CONSULT NOTE ADULT - SUBJECTIVE AND OBJECTIVE BOX
Source of information: , Chart review, patient  Patient language: English  : n/a    CC: Patient is a 89y old  Female who presents with a chief complaint of L hip fracture (2022 15:55)      HPI:  89 year old female PMH COPD, CHF, HTN, hypothyroid coming in s/p fall with left hip pain. Used [employer]  # [id number] but patient was  was unable to hear the patient as she was speaking very low. Called daughter, Jamila, who stated she was told her mother fell in the bathroom at the NH for which she was brought to the ED. She states her mother has chronic anemia and does not want to prolong her suffering so refuses blood transfusions. She is tired of living in pain and has made it clear to family that she wants to be DNR/DNI. Molst form in NH papers.  (2022 03:37)    Pt s/p fall.  Pt with left hip fracture.  + left hip pain which is minimal.  No nausea or vomiting.  Pt is confused at times.  Pt is concerned about urinating.  Grimaldo is in place. Pt speak Taishenese.  Pt with chronic anemia and has been refusing transfusons.  Pt also refusing surgery at this time.  Comfort measures only.         PAIN SCORE:  4/10     SCALE USED: (1-10 VNRS)    PAST MEDICAL & SURGICAL HISTORY:  HTN (hypertension)    Hypothyroid    COPD (chronic obstructive pulmonary disease)    Heart failure        FAMILY HISTORY:        SOCIAL HISTORY:  [x ] Denies Smoking, Alcohol, or Drug Use    Allergies    No Known Allergies    Intolerances        MEDICATIONS:    MEDICATIONS  (STANDING):  acetaminophen     Tablet .. 1000 milliGRAM(s) Oral every 8 hours  amLODIPine   Tablet 5 milliGRAM(s) Oral daily  budesonide  80 MICROgram(s)/formoterol 4.5 MICROgram(s) Inhaler 2 Puff(s) Inhalation two times a day  cefTRIAXone   IVPB      ferrous    sulfate Liquid 300 milliGRAM(s) Oral every 12 hours  furosemide    Tablet 40 milliGRAM(s) Oral two times a day  heparin   Injectable 5000 Unit(s) SubCutaneous every 8 hours  insulin lispro (ADMELOG) corrective regimen sliding scale   SubCutaneous every 6 hours  levothyroxine 25 MICROGram(s) Oral daily  metoprolol tartrate 25 milliGRAM(s) Oral two times a day  mirtazapine 7.5 milliGRAM(s) Oral at bedtime  senna 2 Tablet(s) Oral at bedtime    MEDICATIONS  (PRN):  oxyCODONE    IR 2.5 milliGRAM(s) Oral every 4 hours PRN Moderate Pain (4 - 6)  oxyCODONE    IR 5 milliGRAM(s) Oral every 4 hours PRN Severe Pain (7 - 10)      Vital Signs Last 24 Hrs  T(C): 36.6 (2022 17:18), Max: 36.7 (2022 01:06)  T(F): 97.8 (2022 17:18), Max: 98 (2022 01:06)  HR: 76 (:18) (74 - 100)  BP: 111/46 (:18) (108/49 - 130/69)  BP(mean): --  RR: 19 (:18) (16 - 20)  SpO2: 100% (:18) (100% - 100%)    LABS:                          6.7    5.64  )-----------( 215      ( 2022 06:23 )             23.1         137  |  106  |  25<H>  ----------------------------<  134<H>  4.2   |  27  |  1.37<H>    Ca    8.3<L>      2022 06:23  Phos  3.9       Mg     2.4         TPro  6.9  /  Alb  2.5<L>  /  TBili  0.7  /  DBili  x   /  AST  28  /  ALT  26  /  AlkPhos  115  -    PT/INR - ( 2022 01:43 )   PT: 13.0 sec;   INR: 1.10 ratio         PTT - ( 2022 01:43 )  PTT:35.9 sec  LIVER FUNCTIONS - ( 2022 06:23 )  Alb: 2.5 g/dL / Pro: 6.9 g/dL / ALK PHOS: 115 U/L / ALT: 26 U/L DA / AST: 28 U/L / GGT: x           Urinalysis Basic - ( 2022 02:13 )    Color: Yellow / Appearance: Clear / S.020 / pH: x  Gluc: x / Ketone: Negative  / Bili: Negative / Urobili: Negative   Blood: x / Protein: 15 / Nitrite: Positive   Leuk Esterase: Negative / RBC: 2-5 /HPF / WBC 0-2 /HPF   Sq Epi: x / Non Sq Epi: Few /HPF / Bacteria: Few /HPF      CAPILLARY BLOOD GLUCOSE      POCT Blood Glucose.: 137 mg/dL (2022 16:22)  POCT Blood Glucose.: 174 mg/dL (2022 11:34)  POCT Blood Glucose.: 109 mg/dL (2022 01:36)      REVIEW OF SYSTEMS:  CONSTITUTIONAL: No fever or fatigue  RESPIRATORY: No cough, wheezing, chills or hemoptysis; No shortness of breath  CARDIOVASCULAR: No chest pain, palpitations, dizziness, or leg swelling  GASTROINTESTINAL: No abdominal or epigastric pain. No nausea, vomiting; No diarrhea or constipation.   GENITOURINARY: + grimaldo  MUSCULOSKELETAL: + left hip pain   NEURO: No weakness, no numbness   PSYCHIATRIC: No depression, anxiety, mood swings, or difficulty sleeping    PHYSICAL EXAM:  GENERAL:  Alert & Oriented X1  CHEST/LUNG: Clear to auscultation bilaterally; No rales, rhonchi, wheezing, or rubs  HEART: Regular rate and rhythm; No murmurs, rubs, or gallops  ABDOMEN: Soft, Nontender, Nondistended; Bowel sounds present  : + grimaldo   EXTREMITIES:  2+ Peripheral Pulses, No cyanosis, or edema  MUSCULOSKELETAL: + left hip tenderness   NEUROLOGICAL: awake and alert and oriented   SKIN: No rashes or lesions    Radiology:    Drug Screen:  heparin   Injectable 5000 Unit(s) SubCutaneous every 8 hours    cefTRIAXone   IVPB            ORT Score   Family Hx of substance abuse	Female	Male  Alcohol 	                                              1              3  Illegal drugs	                                      2              3  Rx drugs                                               4	      4    Personal Hx of substance abuse		  Alcohol 	                                               3	      3  Illegal drugs                                  	       4	      4  Rx drugs                                                5	      5  Age between 16- 45 years	               1             1  hx preadolescent sexual abuse	       3	      0  Psychological disease		  ADD, OCD, bipolar, schizophrenia	2	      2  Depression                                    	1	      1  Score totals 		0   		  a score of 3 or lower indicates low risk for opioid abuse		  a score of 4-7 indicates moderate risk for opioid abuse		  a score of 8 or higher indicates high risk for opioid abuse	    Risk factors associated with adverse outcomes related to opioid treatment  [ ]  Concurrent benzodiazepine use  [ ]  History/ Active substance use or alcohol use disorder  [ ] Psychiatric co-morbidity  [ ] Sleep apnea  [ ] COPD  [ ] BMI> 35  [ ] Liver dysfunction  [ ] Renal dysfunction  [ ] CHF  [ ] Smoker  [ x]  Age > 60 years      [x ]  [place]  Reviewed and Copied to Chart. See below.

## 2022-01-28 NOTE — CONSULT NOTE ADULT - PROBLEM SELECTOR RECOMMENDATION 2
-daughter unsure of etiology, was told r/t bleeding in past but pt refused colonoscopy  -no transfusion per pt

## 2022-01-28 NOTE — CHART NOTE - NSCHARTNOTEFT_GEN_A_CORE
EVENT: NP medicine    OBJECTIVE:  Vital Signs Last 24 Hrs  T(C): 36.4 (28 Jan 2022 06:01), Max: 36.7 (28 Jan 2022 01:06)  T(F): 97.6 (28 Jan 2022 06:01), Max: 98 (28 Jan 2022 01:06)  HR: 100 (28 Jan 2022 06:01) (74 - 100)  BP: 130/69 (28 Jan 2022 06:01) (118/67 - 130/69)  BP(mean): --  RR: 20 (28 Jan 2022 06:01) (16 - 20)  SpO2: 100% (28 Jan 2022 06:01) (100% - 100%)    LABS:                        6.7    5.64  )-----------( 215      ( 28 Jan 2022 06:23 )             23.1     01-28    137  |  106  |  25<H>  ----------------------------<  134<H>  4.2   |  27  |  1.37<H>    Ca    8.3<L>      28 Jan 2022 06:23  Phos  3.9     01-28  Mg     2.4     01-28    TPro  6.9  /  Alb  2.5<L>  /  TBili  0.7  /  DBili  x   /  AST  28  /  ALT  26  /  AlkPhos  115  01-28      EKG:   IMGAGING:    ASSESSMENT:  HPI:  89 year old female PMH COPD, CHF, HTN, hypothyroid coming in s/p fall with left hip pain. Used [employer]  # [id number] but patient was  was unable to hear the patient as she was speaking very low. Called daughter, Jamila, who stated she was told her mother fell in the bathroom at the NH for which she was brought to the ED. She states her mother has chronic anemia and does not want to prolong her suffering so refuses blood transfusions. She is tired of living in pain and has made it clear to family that she wants to be DNR/DNI. Molst form in NH papers.  (28 Jan 2022 03:37)      PLAN: Patient refuse blood transfusion. Patient DNR/DNI. Ortho no surgery at this time due to severe anemia.  -pending official palliative consult  -Medical attending aware

## 2022-01-28 NOTE — ED ADULT NURSE NOTE - CHIEF COMPLAINT QUOTE
BIBA from [facility] facility for s/p fall, c/o left hip and left sided pain, pt was found on floor by staff, denies LOC

## 2022-01-28 NOTE — CONSULT NOTE ADULT - ASSESSMENT
rug Utilization Report below displays all of the controlled substance prescriptions, if any, that your patient has filled in the last twelve months. The information displayed on this report is compiled from pharmacy submissions to the Department, and accurately reflects the information as submitted by the pharmacies.    This report was requested by: Campos Boles | Reference #: [id number]    Others' Prescriptions  Patient Name: Yolie Valle Date: 05/17/1927  Address: [place] [id number]: Female  Rx Written	Rx Dispensed	Drug	Quantity	Days Supply	Prescriber Name	Prescriber Nadja #	Payment Method	Dispenser  01/13/2022	01/14/2022	tramadol hcl 50 mg tablet	21	7	ByronCarlo MD	[id number]	Insurance	[id number]  11/02/2021	11/02/2021	tramadol hcl 50 mg tablet	30	10	ByronCarlo MD	[id number]	Insurance	[id number]  06/17/2021	06/17/2021	tramadol hcl 50 mg tablet	10	5	ByronCarlo MD	[id number]	Insurance	[id number]  06/14/2021	06/14/2021	tramadol hcl 50 mg tablet	3	3	Cecilia Hall MD	[id number]	Insurance	[id number]  * - Drugs marked with an asterisk are compound drugs. If the compound drug is made up of more than one controlled substance, then each controlled substance will be a separate row in the table.

## 2022-01-28 NOTE — H&P ADULT - PROBLEM SELECTOR PLAN 1
Presenting after a fall   hip xray: left hip fracture  Ortho consulted by ED  Cardiac clearance   Pain control with tylenol   NPO except meds Presenting after a fall   hip xray: left hip fracture  Ortho consulted by ED  Cardiac clearance   Pain control with tylenol   NPO except meds  Palliative Dr. Taylor consulted

## 2022-01-28 NOTE — ED PROVIDER NOTE - PROGRESS NOTE DETAILS
labs with anemia. xrays with +left hip fx. spoke with pts daughter hilary- pt has been refusing blood transfusions recently so at this time no blood transfusion. Just today the family was having conversations about possibly hospice/comfort care to prevent suffering. Daughter unsure if they would want hip surgery or not- wants to speak with remainder of family to officially decide. will admit for pain control and further dispo.

## 2022-01-28 NOTE — CHART NOTE - NSCHARTNOTEFT_GEN_A_CORE
Discussion with patient and family today using [facility]  for the patient (maria del rosario - [id number]) via audio . Patient speaks minimal english.    Patient is alert and oriented x3. Patient has some difficulty speaking due to breathing and lack of dentition, but still comprehensible to the  with some difficulty.  Through conversation, she knows her , sometimes stating her lunar birthday is in April and her solar birthday is in May. Apparently patient is officially born in 1927, but was actually born earlier, and sometimes states she is 89 (confirmed by daughter, Jamila, and son, Shakir).   She knows where she is (hospital) and where she lives ([place] - [facility], and [place]- [facility]).   She is aware of current events, but is not clear what day/time it is.     Patient was aware that she fell and was explained that she had a fracture of her left hip. She blames the blood draws at the NH caused her to be dizzy and so she fell. She has pain only with motion, but is comfortable at rest.   Patient was made aware that her hgb is <7 as well.  Patient refused blood tranfusion   Patient also refused any surgical intervention stating "I am too old" "I don't want blood, I don't want to do surgery"    Discussion had with patient and Dr. Wright as well, patient continues to refuse treatment.     PE:   LLE:   Externally rotated shortened LLE. bilateraly edema of the legs - likely from pulmonary HTN/HF  skin intact. grossly NVI. distal pulses 2+. SILT. no open lesions  (+)logroll. mild TTP left groin area.       Plan:   Non-operative - conservative treatment.   NWB to LLE   comfort care  appreciate palliative consult  can restart heparin/anticoagulation  PT consult for conditioning and teaching for transfers/movements for the patient. Discussion with patient and family today using [facility]  for the patient (maria del rosario - [id number]) via audio . Patient speaks minimal english.    Patient is alert and oriented x3. Patient has some difficulty speaking due to breathing and lack of dentition, but still comprehensible to the  with some difficulty.  Through conversation, she knows her , sometimes stating her lunar birthday is in April and her solar birthday is in May. Apparently patient is officially born in 1927, but was actually born earlier, and sometimes states she is 89 (confirmed by daughter, Jamila, and son, Shakir).   She knows where she is (hospital) and [facility] [facility] and [place]   She is aware of current events, but is not clear what day/time it is.     Patient was aware that she fell and was explained that she had a fracture of her left hip. She blames the blood draws at the NH caused her to be dizzy and so she fell. She has pain only with motion, but is comfortable at rest.   Patient was made aware that her hgb is <7 as well.  Patient refused blood tranfusion   Patient also refused any surgical intervention stating "I am too old" "I don't want blood, I don't want to do surgery"    Discussion had with patient and Dr. Wright as well, patient continues to refuse treatment.     PE:   LLE:   Externally rotated shortened LLE. bilateraly edema of the legs - likely from pulmonary HTN/HF  skin intact. grossly NVI. distal pulses 2+. SILT. no open lesions  (+)logroll. mild TTP left groin area.       Plan:   Non-operative - conservative treatment.   NWB to LLE   comfort care  appreciate palliative consult  can restart heparin/anticoagulation  PT consult for conditioning and teaching for transfers/movements for the patient.    --------------------------------  Note reviewed.  Saw pt on 22 at noon time.  She still denied surgery option. So will treat non-op.    Signed by Dr. Wright on 22 at 9:23

## 2022-01-28 NOTE — ED PROVIDER NOTE - CLINICAL SUMMARY MEDICAL DECISION MAKING FREE TEXT BOX
89 year old female with left hip pain s/p fall. PE as above.  wokrup, xray, pain control, likely admission

## 2022-01-28 NOTE — ED ADULT TRIAGE NOTE - CHIEF COMPLAINT QUOTE
BIBA from [facility] facility for s/p fall, c/o left hip and left sided pain, pt was found on floor by staff, denies LOC

## 2022-01-28 NOTE — H&P ADULT - HISTORY OF PRESENT ILLNESS
89 year old female PMH COPD, CHF, HTN, hypothyroid coming in s/p fall with left hip pain. pt states she was going to the bathroom and felt dizzy and fell onto her left side. denies head trauma or LOC. states she chronically feels dizzy. denies all other complaints 89 year old female PMH COPD, CHF, HTN, hypothyroid coming in s/p fall with left hip pain. Used [employer]  # [id number] but patient was  was unable to hear the patient as she was speaking very low. Called daughter, Jamila, who stated she was told her mother fell in the bathroom at the NH for which she was brought to the ED. She states her mother has chronic anemia and does not want to prolong her suffering so refuses blood transfusions. She is tired of living in pain and has made it clear to family that she wants to be DNR/DNI. Molst form in NH papers.  PRINCIPAL PROCEDURE  Procedure: Diagnostic laparoscopy  Findings and Treatment:       SECONDARY PROCEDURE  Procedure: Cholecystectomy, partial  Findings and Treatment:

## 2022-01-28 NOTE — CONSULT NOTE ADULT - SUBJECTIVE AND OBJECTIVE BOX
Consult to: Discuss complex medical decision making related to goals of care    [facility] Geriatric and Palliative Consult Service:  Dr. Evelyne Valladares: cell ([phone])  Dr. Ester Taylor: cell ([phone])   Deandra Harper NP: cell ([phone])  Beba Lane NP: cell ([phone])   Reynaldo Ila LSW: cell ([phone])     HPI:  89 year old female PMH COPD, CHF, HTN, hypothyroid coming in s/p fall with left hip pain. Used [employer]  # [id number] but patient was  was unable to hear the patient as she was speaking very low. Called daughter, Jamila, who stated she was told her mother fell in the bathroom at the NH for which she was brought to the ED. She states her mother has chronic anemia and does not want to prolong her suffering so refuses blood transfusions. She is tired of living in pain and has made it clear to family that she wants to be DNR/DNI. Molst form in NH papers.  (2022 03:37)    Interval history: Pt s/p L hip fx, she has stated she does not want surgery or further transfusions.       PAST MEDICAL & SURGICAL HISTORY:  HTN (hypertension)    Hypothyroid    COPD (chronic obstructive pulmonary disease)    Heart failure        SOCIAL HISTORY:    Admitted from:  home  (with HHA)           assisted living          [facility]   [ none ] Substance abuse, [ none ] Tobacco hx, [ none ] Alcohol hx, [ none ] Home Opioid Hx    FAMILY HISTORY:   unable to obtain from patient due to poor mentation, family unable to give information, see H&P for history  Baseline ADLs (prior to admission):    Allergies    No Known Allergies    Intolerances      Present Symptoms:   Pain:  Fatigue:  Nausea:  Lack of Appetite:   SOB:  Depression:  Anxiety:  Review of Systems: [All others negative or Unable to obtain due to poor mentation]    MEDICATIONS  (STANDING):  acetaminophen     Tablet .. 1000 milliGRAM(s) Oral every 8 hours  amLODIPine   Tablet 5 milliGRAM(s) Oral daily  budesonide  80 MICROgram(s)/formoterol 4.5 MICROgram(s) Inhaler 2 Puff(s) Inhalation two times a day  cefTRIAXone   IVPB      ferrous    sulfate Liquid 300 milliGRAM(s) Oral every 12 hours  furosemide    Tablet 40 milliGRAM(s) Oral two times a day  heparin   Injectable 5000 Unit(s) SubCutaneous every 8 hours  insulin lispro (ADMELOG) corrective regimen sliding scale   SubCutaneous every 6 hours  levothyroxine 25 MICROGram(s) Oral daily  metoprolol tartrate 25 milliGRAM(s) Oral two times a day  mirtazapine 7.5 milliGRAM(s) Oral at bedtime  senna 2 Tablet(s) Oral at bedtime    MEDICATIONS  (PRN):  oxyCODONE    IR 2.5 milliGRAM(s) Oral every 4 hours PRN Moderate Pain (4 - 6)  oxyCODONE    IR 5 milliGRAM(s) Oral every 4 hours PRN Severe Pain (7 - 10)      PHYSICAL EXAM:  Vital Signs Last 24 Hrs  T(C): 36.6 (2022 13:55), Max: 36.7 (2022 01:06)  T(F): 97.9 (2022 13:55), Max: 98 (2022 01:06)  HR: 87 (2022 13:55) (74 - 100)  BP: 108/49 (2022 13:55) (108/49 - 130/69)  BP(mean): --  RR: 20 (2022 13:55) (16 - 20)  SpO2: 100% (2022 13:55) (100% - 100%)    General: alert  oriented x ____    lethargic distressed cachexia  nonverbal  unarousable verbal    Palliative Performance Scale/Karnofsky Score:  ECOG Performance:    HEENT: no abnormal lesion, dry mouth  ET tube/trach oral lesions:  Lungs: tachypnea/labored breathing, audible excessive secretions  CV: RRR, S1S2, tachycardia  GI: soft non distended non tender  incontinent               PEG/NG/OG tube  constipation  last BM:   : incontinent  oliguria/anuria  grimaldo  Musculoskeletal: weakness x4 edema x4    ambulatory with assistance   mostly/fully bedbound/wheelchair bound  Skin: no abnormal skin lesions, poor skin turgor, pressure ulcer stage:   Neuro: no deficits, cognitive impairment dsyphagia/dysarthria paresis  Oral intake ability: unable/only mouth care, minimal moderate full capability    LABS:                        6.7    5.64  )-----------( 215      ( 2022 06:23 )             23.1         137  |  106  |  25<H>  ----------------------------<  134<H>  4.2   |  27  |  1.37<H>    Ca    8.3<L>      2022 06:23  Phos  3.9       Mg     2.4         TPro  6.9  /  Alb  2.5<L>  /  TBili  0.7  /  DBili  x   /  AST  28  /  ALT  26  /  AlkPhos  115      Urinalysis Basic - ( 2022 02:13 )    Color: Yellow / Appearance: Clear / S.020 / pH: x  Gluc: x / Ketone: Negative  / Bili: Negative / Urobili: Negative   Blood: x / Protein: 15 / Nitrite: Positive   Leuk Esterase: Negative / RBC: 2-5 /HPF / WBC 0-2 /HPF   Sq Epi: x / Non Sq Epi: Few /HPF / Bacteria: Few /HPF        RADIOLOGY & ADDITIONAL STUDIES:     Consult to: Discuss complex medical decision making related to goals of care    [facility] Geriatric and Palliative Consult Service:  Dr. Evelyne Valladares: cell ([phone])  Dr. Ester Taylor: cell ([phone])   Deandra Harper NP: cell ([phone])  Beba Lane NP: cell ([phone])   Reynaldo Ila LSW: cell ([phone])     HPI:  89 year old female PMH COPD, CHF, HTN, hypothyroid coming in s/p fall with left hip pain. Used [employer]  # [id number] but patient was  was unable to hear the patient as she was speaking very low. Called daughter, Jamila, who stated she was told her mother fell in the bathroom at the NH for which she was brought to the ED. She states her mother has chronic anemia and does not want to prolong her suffering so refuses blood transfusions. She is tired of living in pain and has made it clear to family that she wants to be DNR/DNI. Molst form in NH papers.  (2022 03:37)    Interval history: Pt s/p L hip fx, she has stated she does not want surgery or further transfusions.       PAST MEDICAL & SURGICAL HISTORY:  HTN (hypertension)    Hypothyroid    COPD (chronic obstructive pulmonary disease)    Heart failure        SOCIAL HISTORY:    Admitted from:  home  (with HHA)           assisted living          [facility]   [ none ] Substance abuse, [ none ] Tobacco hx, [ none ] Alcohol hx, [ none ] Home Opioid Hx    FAMILY HISTORY:   unable to obtain from patient due to poor mentation, family unable to give information, see H&P for history  Baseline ADLs (prior to admission):    Allergies    No Known Allergies    Intolerances      Present Symptoms:   Pain: denies at rest at time of visit  Fatigue: severe  Nausea: denies  Lack of Appetite: moderate  SOB: denies  Depression: denies  Anxiety: denies  Review of Systems: All others negative    MEDICATIONS  (STANDING):  acetaminophen     Tablet .. 1000 milliGRAM(s) Oral every 8 hours  amLODIPine   Tablet 5 milliGRAM(s) Oral daily  budesonide  80 MICROgram(s)/formoterol 4.5 MICROgram(s) Inhaler 2 Puff(s) Inhalation two times a day  cefTRIAXone   IVPB      ferrous    sulfate Liquid 300 milliGRAM(s) Oral every 12 hours  furosemide    Tablet 40 milliGRAM(s) Oral two times a day  heparin   Injectable 5000 Unit(s) SubCutaneous every 8 hours  insulin lispro (ADMELOG) corrective regimen sliding scale   SubCutaneous every 6 hours  levothyroxine 25 MICROGram(s) Oral daily  metoprolol tartrate 25 milliGRAM(s) Oral two times a day  mirtazapine 7.5 milliGRAM(s) Oral at bedtime  senna 2 Tablet(s) Oral at bedtime    MEDICATIONS  (PRN):  oxyCODONE    IR 2.5 milliGRAM(s) Oral every 4 hours PRN Moderate Pain (4 - 6)  oxyCODONE    IR 5 milliGRAM(s) Oral every 4 hours PRN Severe Pain (7 - 10)      PHYSICAL EXAM:  Vital Signs Last 24 Hrs  T(C): 36.6 (2022 13:55), Max: 36.7 (2022 01:06)  T(F): 97.9 (2022 13:55), Max: 98 (2022 01:06)  HR: 87 (2022 13:55) (74 - 100)  BP: 108/49 (2022 13:55) (108/49 - 130/69)  BP(mean): --  RR: 20 (2022 13:55) (16 - 20)  SpO2: 100% (2022 13:55) (100% - 100%)    General: alert  oriented x 2-3, oriented to self, place, situation, confused about time able to reorient    lethargic     Palliative Performance Scale/Karnofsky Score: 30%    HEENT: no abnormal lesion, dry mouth   Lungs: nonlabor with nc in place  CV: RRR, S1S2 RRR  GI: soft non distended non tender  incontinent  :   grimaldo  Musculoskeletal: weakness x4 edema x4    fully bedbound  Skin: no abnormal skin lesions, poor skin turgor  Neuro: no deficits  Oral intake ability:  full capability    LABS:                        6.7    5.64  )-----------( 215      ( 2022 06:23 )             23.1     -    137  |  106  |  25<H>  ----------------------------<  134<H>  4.2   |  27  |  1.37<H>    Ca    8.3<L>      2022 06:23  Phos  3.9       Mg     2.4         TPro  6.9  /  Alb  2.5<L>  /  TBili  0.7  /  DBili  x   /  AST  28  /  ALT  26  /  AlkPhos  115      Urinalysis Basic - ( 2022 02:13 )    Color: Yellow / Appearance: Clear / S.020 / pH: x  Gluc: x / Ketone: Negative  / Bili: Negative / Urobili: Negative   Blood: x / Protein: 15 / Nitrite: Positive   Leuk Esterase: Negative / RBC: 2-5 /HPF / WBC 0-2 /HPF   Sq Epi: x / Non Sq Epi: Few /HPF / Bacteria: Few /HPF        RADIOLOGY & ADDITIONAL STUDIES:     Consult to: Discuss complex medical decision making related to goals of care    [facility] Geriatric and Palliative Consult Service:  Dr. Evelyne Valladares: cell ([phone])  Dr. Ester Taylor: cell ([phone])   Deandra Harper NP: cell ([phone])  Beba Lane NP: cell ([phone])   Reynaldo lIa LSW: cell ([phone])     HPI:  89 year old female PMH COPD, CHF, HTN, hypothyroid coming in s/p fall with left hip pain. Used [employer]  # [id number] but patient was  was unable to hear the patient as she was speaking very low. Called daughter, Jamila, who stated she was told her mother fell in the bathroom at the NH for which she was brought to the ED. She states her mother has chronic anemia and does not want to prolong her suffering so refuses blood transfusions. She is tired of living in pain and has made it clear to family that she wants to be DNR/DNI. Molst form in NH papers.  (2022 03:37)    Interval history: Pt s/p L hip fx, she has stated she does not want surgery or further transfusions.       PAST MEDICAL & SURGICAL HISTORY:  HTN (hypertension)    Hypothyroid    COPD (chronic obstructive pulmonary disease)    Heart failure        SOCIAL HISTORY:    Admitted from:  home  (with HHA)           assisted living          [facility]   [ none ] Substance abuse, [ none ] Tobacco hx, [ none ] Alcohol hx, [ none ] Home Opioid Hx    FAMILY HISTORY:   unable to obtain from patient due to poor mentation, family unable to give information, see H&P for history  Baseline ADLs (prior to admission):    Allergies    No Known Allergies    Intolerances      Present Symptoms:   Pain: denies at rest at time of visit  Fatigue: severe  Nausea: denies  Lack of Appetite: moderate  SOB: denies  Depression: denies  Anxiety: denies  Review of Systems: All others negative    MEDICATIONS  (STANDING):  acetaminophen     Tablet .. 1000 milliGRAM(s) Oral every 8 hours  amLODIPine   Tablet 5 milliGRAM(s) Oral daily  budesonide  80 MICROgram(s)/formoterol 4.5 MICROgram(s) Inhaler 2 Puff(s) Inhalation two times a day  cefTRIAXone   IVPB      ferrous    sulfate Liquid 300 milliGRAM(s) Oral every 12 hours  furosemide    Tablet 40 milliGRAM(s) Oral two times a day  heparin   Injectable 5000 Unit(s) SubCutaneous every 8 hours  insulin lispro (ADMELOG) corrective regimen sliding scale   SubCutaneous every 6 hours  levothyroxine 25 MICROGram(s) Oral daily  metoprolol tartrate 25 milliGRAM(s) Oral two times a day  mirtazapine 7.5 milliGRAM(s) Oral at bedtime  senna 2 Tablet(s) Oral at bedtime    MEDICATIONS  (PRN):  oxyCODONE    IR 2.5 milliGRAM(s) Oral every 4 hours PRN Moderate Pain (4 - 6)  oxyCODONE    IR 5 milliGRAM(s) Oral every 4 hours PRN Severe Pain (7 - 10)      PHYSICAL EXAM:  Vital Signs Last 24 Hrs  T(C): 36.6 (2022 13:55), Max: 36.7 (2022 01:06)  T(F): 97.9 (2022 13:55), Max: 98 (2022 01:06)  HR: 87 (2022 13:55) (74 - 100)  BP: 108/49 (2022 13:55) (108/49 - 130/69)  BP(mean): --  RR: 20 (2022 13:55) (16 - 20)  SpO2: 100% (2022 13:55) (100% - 100%)    General: alert  oriented x 2-3, oriented to self, place, situation, confused about time able to reorient    lethargic     Palliative Performance Scale/Karnofsky Score: 30%    HEENT: no abnormal lesion, dry mouth   Lungs: nonlabor with nc in place  CV: RRR, S1S2 RRR  GI: soft non distended non tender  incontinent  :   grimaldo  Musculoskeletal: weakness x4 edema x4    fully bedbound  Skin: no abnormal skin lesions, poor skin turgor  Neuro: no deficits  Oral intake ability:  full capability    LABS:                        6.7    5.64  )-----------( 215      ( 2022 06:23 )             23.1     -    137  |  106  |  25<H>  ----------------------------<  134<H>  4.2   |  27  |  1.37<H>    Ca    8.3<L>      2022 06:23  Phos  3.9       Mg     2.4         TPro  6.9  /  Alb  2.5<L>  /  TBili  0.7  /  DBili  x   /  AST  28  /  ALT  26  /  AlkPhos  115      Urinalysis Basic - ( 2022 02:13 )    Color: Yellow / Appearance: Clear / S.020 / pH: x  Gluc: x / Ketone: Negative  / Bili: Negative / Urobili: Negative   Blood: x / Protein: 15 / Nitrite: Positive   Leuk Esterase: Negative / RBC: 2-5 /HPF / WBC 0-2 /HPF   Sq Epi: x / Non Sq Epi: Few /HPF / Bacteria: Few /HPF        RADIOLOGY & ADDITIONAL STUDIES:  < from: Transthoracic Echocardiogram (22 @ 10:52) >  Patient name: JUAN DIEGO SANFORD  YOB: 1927   Age: 89 (F)   MR#: [id number]  Study Date: 2022  Location: [address] [place], [place] [address]Sonographer: Chris Chatterjee RENUKA  Study quality: Technically good  Referring Physician:  AMAN BARDALES MD  Blood Pressure: 130/69 mmHg  Height: 149 cm  Weight: 54 kg  BSA: 1.5 m2  ------------------------------------------------------------------------    PROCEDURE: Transthoracic echocardiogram with 2-D, M-Mode  and complete spectral and color flow Doppler.  INDICATION: Pre-op exam  (Z01.818)  HISTORY:  ------------------------------------------------------------------------  DIMENSIONS:  Dimensions:     Normal Values:  LA:     3.7 cm    2.0 - 4.0 cm  Ao:     3.0 cm    2.0 - 3.8 cm  SEPTUM: 1.1 cm    0.6 - 1.2 cm  PWT: 0.9 cm    0.6 - 1.1 cm  LVIDd:  3.4 cm    3.0 - 5.6 cm  LVIDs:  2.3 cm    1.8 - 4.0 cm      Derived Variables:  LVMI: 67 g/m2  RWT: 0.52  Ejection Fraction Visual Estimate: 55-60 %  Ejection Fraction Hyde: 65 %  Peak Velocity (m/sec): AoV=3.3  ------------------------------------------------------------------------  OBSERVATIONS:  Mitral Valve: Mitral annular calcification. Trace mitral  regurgitation.  Aortic Root: Aortic Root: 3.0 cm.    Aortic Valve: Calcified trileaflet aortic valve with  decreased opening. Peak transaortic valve gradient equals  43.6 mm Hg, mean transaortic valve gradient equals 20 mm  Hg, estimated aortic valve area equals 1 sqcm (by  continuity equation), consistent with severe aortic  stenosis.The dimensionless index is .35. Mild aortic  regurgitation.  Peak left ventricular outflow tract  gradient equals 4.8 mm Hg.  Left Atrium: Severely dilated left atrium.  LA volume index  = 62 cc/m2.  Left Ventricle: Normal Left Ventricular Systolic Function,  (EF = 55 to60%) Normal left ventricular internal  dimensions and wall thicknesses. Grade II diastolic  dysfunction (moderate).  Right Heart: Normal right atrium. Normal right ventricular  size and systolic function (TAPSE  2.3cm). There is mild  tricuspid regurgitation. There is mild pulmonic  regurgitation.  Pericardium/PleuraNormal pericardium with no pericardial  effusion.  Hemodynamic: RA Pressure is 8 mm Hg. RV systolic pressure  is severely increased at  63 mm Hg.  ------------------------------------------------------------------------  CONCLUSIONS:  1. Mitral annular calcification. Trace mitral  regurgitation.  2. Calcified trileaflet aortic valve with decreased  opening. Peak transaortic valve gradient equals 43.6 mm Hg,  mean transaortic valvegradient equals 20 mm Hg, estimated  aortic valve area equals 1 sqcm (by continuity equation),  consistent with severe aortic stenosis.The dimensionless  index is .35. Mild aortic regurgitation.  3. Aortic Root: 3.0 cm.  4. Severely dilated left atrium.  LA volume index = 62  cc/m2.  5. Normal left ventricular internal dimensions and wall  thicknesses.  6. Normal Left Ventricular Systolic Function,  (EF = 55 to  60%) Peak left ventricular outflow tract gradient equals  4.8 mm Hg.  7. Grade II diastolic dysfunction (moderate).  8. Normal right atrium.  9. Normal right ventricular size and systolic function  (TAPSE  2.3cm).  10. RA Pressure is 8 mm Hg.  11. RV systolic pressure is severely increased at  63 mm  Hg.  12. There is mild tricuspid regurgitation.  13. There is mild pulmonic regurgitation.  14. Normal pericardium with no pericardial effusion.    ------------------------------------------------------------------------  Confirmed on  2022 - 16:42:44 by Reta Trimble MD  ------------------------------------------------------------------------    < end of copied text >

## 2022-01-28 NOTE — CONSULT NOTE ADULT - CONVERSATION DETAILS
Met with the pt at the bedside and discussed GOC via [employer]  [id number]. She expressed her wishes are for comfort measures only, no life prolonging care including IVF and antibiotics. She does not want surgery for hip fx and she does not want further blood transfusions. Priority is on comfort and pain control. Defers to daughter Jamila Hurley for some decisions, would rather daughter complete MOLST form.     Due to the patient's health status and restrictions on visitation during the Public Health Emergency, the Advance Care Planning service was performed via telephone with patient's daughter Jamila Hurley. We reviewed pmhx, recent events and hospital course and her mothers expressed wishes for CMO and no surgery. DNR/I previously in place. Unsure if she had a MOLST form. She is aware of her mother's wishes against life prolonging treatments and priority on honoring them. Reviewed MOLST with her and new MOLST drafted with DNR/I, CMO, no feeding tube, no IVF, no antibiotics.  Per daughter, she had a case open with [facility] and was approved in SNF setting one day PTA. Palliative SW to f/u on this. Daughter unsure regarding placement would like to consider other nursing homes if possible, SW made aware. Palliative will follow. Met with the pt at the bedside and discussed GOC via [employer]  [id number]. She expressed her wishes are for comfort measures only, no life prolonging care including IVF and antibiotics. She does not want surgery for hip fx and she does not want further blood transfusions. Priority is on comfort and pain control. Defers to daughter Jamila Hurley for some decisions, would rather daughter complete MOLST form.     Due to the patient's health status and restrictions on visitation during the Public Health Emergency, the Advance Care Planning service was performed via telephone with patient's daughter Jamila Hurley. We reviewed pmhx, recent events and hospital course and her mothers expressed wishes for CMO and no surgery. DNR/I previously in place. Unsure if she had a MOLST form. She is aware of her mother's wishes against life prolonging treatments and priority on honoring them. Reviewed MOLST with her and new MOLST drafted with DNR/I, CMO, no feeding tube, no IVF, no antibiotics.  Per daughter, she had a case open with [facility] and was approved in SNF setting one day PTA. Daughter unsure regarding placement would like to consider other nursing homes if possible, SW made aware. Palliative will follow. Met with the pt at the bedside and discussed GOC via [employer]  [id number]. She expressed her wishes are for comfort measures only, no life prolonging care including IVF and antibiotics. She does not want surgery for hip fx and she does not want further blood transfusions. She was very articulate in her wishes which she expressed to multiple providers during this admission. Priority is on comfort and pain control. Defers to daughter Jamila Hurley for some decisions, would rather daughter complete MOLST form.     Due to the patient's health status and restrictions on visitation during the Public Health Emergency, the Advance Care Planning service was performed via telephone with patient's daughter Jamila Hurley. We reviewed pmhx, recent events and hospital course at length. She shared that she is well aware of her mother's wishes, and 1 day PTA she was accepted by [facility] and was going to be admitted but this was delayed due to her fall and subsequent hospitalization. She wants to honor her wishes for CMO and no surgery and no life prolonging treatments. DNR/I previously in place. Unsure if she had a MOLST form previously. Educated on  MOLST with her and new MOLST drafted with DNR/I, CMO, no feeding tube, no IVF, no antibiotics.  Daughter unsure regarding nursing home placement would like to consider other nursing homes if possible, SW made aware. Palliative will follow.

## 2022-01-28 NOTE — ED ADULT NURSE NOTE - NSFALLRSKHRMRISKTYPE_ED_ALL_ED
How Severe Are Your Spot(S)?: mild
Have Your Spot(S) Been Treated In The Past?: has not been treated
Hpi Title: Evaluation of a Skin Lesion
age(85 years old or older)

## 2022-01-28 NOTE — ED PROVIDER NOTE - NSICDXPASTMEDICALHX_GEN_ALL_CORE_FT
PAST MEDICAL HISTORY:  COPD (chronic obstructive pulmonary disease)     Heart failure     HTN (hypertension)     Hypothyroid

## 2022-01-28 NOTE — H&P ADULT - CONVERSATION DETAILS
Called daughter, Jamila, who stated she was told her mother fell in the bathroom at the NH for which she was brought to the ED. She states her mother has chronic anemia and does not want to prolong her suffering so refuses blood transfusions. She is tired of living in pain and has made it clear to family that she wants to be DNR/DNI. Molst form in NH papers.

## 2022-01-29 LAB
A1C WITH ESTIMATED AVERAGE GLUCOSE RESULT: 5.2 % — SIGNIFICANT CHANGE UP (ref 4–5.6)
ALBUMIN SERPL ELPH-MCNC: 2.3 G/DL — LOW (ref 3.5–5)
ALP SERPL-CCNC: 95 U/L — SIGNIFICANT CHANGE UP (ref 40–120)
ALT FLD-CCNC: 27 U/L DA — SIGNIFICANT CHANGE UP (ref 10–60)
ANION GAP SERPL CALC-SCNC: 6 MMOL/L — SIGNIFICANT CHANGE UP (ref 5–17)
AST SERPL-CCNC: 22 U/L — SIGNIFICANT CHANGE UP (ref 10–40)
BILIRUB SERPL-MCNC: 0.5 MG/DL — SIGNIFICANT CHANGE UP (ref 0.2–1.2)
BUN SERPL-MCNC: 45 MG/DL — HIGH (ref 7–18)
CALCIUM SERPL-MCNC: 7.9 MG/DL — LOW (ref 8.4–10.5)
CHLORIDE SERPL-SCNC: 106 MMOL/L — SIGNIFICANT CHANGE UP (ref 96–108)
CO2 SERPL-SCNC: 27 MMOL/L — SIGNIFICANT CHANGE UP (ref 22–31)
CREAT SERPL-MCNC: 1.79 MG/DL — HIGH (ref 0.5–1.3)
ESTIMATED AVERAGE GLUCOSE: 103 MG/DL — SIGNIFICANT CHANGE UP (ref 68–114)
GLUCOSE BLDC GLUCOMTR-MCNC: 109 MG/DL — HIGH (ref 70–99)
GLUCOSE BLDC GLUCOMTR-MCNC: 116 MG/DL — HIGH (ref 70–99)
GLUCOSE BLDC GLUCOMTR-MCNC: 142 MG/DL — HIGH (ref 70–99)
GLUCOSE BLDC GLUCOMTR-MCNC: 167 MG/DL — HIGH (ref 70–99)
GLUCOSE SERPL-MCNC: 115 MG/DL — HIGH (ref 70–99)
HCT VFR BLD CALC: 21.1 % — LOW (ref 34.5–45)
HGB BLD-MCNC: 6.2 G/DL — CRITICAL LOW (ref 11.5–15.5)
MCHC RBC-ENTMCNC: 19.8 PG — LOW (ref 27–34)
MCHC RBC-ENTMCNC: 29.4 GM/DL — LOW (ref 32–36)
MCV RBC AUTO: 67.4 FL — LOW (ref 80–100)
NRBC # BLD: 0 /100 WBCS — SIGNIFICANT CHANGE UP (ref 0–0)
PLATELET # BLD AUTO: 214 K/UL — SIGNIFICANT CHANGE UP (ref 150–400)
POTASSIUM SERPL-MCNC: 4.7 MMOL/L — SIGNIFICANT CHANGE UP (ref 3.5–5.3)
POTASSIUM SERPL-SCNC: 4.7 MMOL/L — SIGNIFICANT CHANGE UP (ref 3.5–5.3)
PROT SERPL-MCNC: 6.5 G/DL — SIGNIFICANT CHANGE UP (ref 6–8.3)
RBC # BLD: 3.13 M/UL — LOW (ref 3.8–5.2)
RBC # FLD: 18.2 % — HIGH (ref 10.3–14.5)
SODIUM SERPL-SCNC: 139 MMOL/L — SIGNIFICANT CHANGE UP (ref 135–145)
WBC # BLD: 7.08 K/UL — SIGNIFICANT CHANGE UP (ref 3.8–10.5)
WBC # FLD AUTO: 7.08 K/UL — SIGNIFICANT CHANGE UP (ref 3.8–10.5)

## 2022-01-29 RX ORDER — FUROSEMIDE 40 MG
20 TABLET ORAL ONCE
Refills: 0 | Status: COMPLETED | OUTPATIENT
Start: 2022-01-29 | End: 2022-01-29

## 2022-01-29 RX ADMIN — HEPARIN SODIUM 5000 UNIT(S): 5000 INJECTION INTRAVENOUS; SUBCUTANEOUS at 22:01

## 2022-01-29 RX ADMIN — Medication 1000 MILLIGRAM(S): at 22:30

## 2022-01-29 RX ADMIN — MIRTAZAPINE 7.5 MILLIGRAM(S): 45 TABLET, ORALLY DISINTEGRATING ORAL at 22:02

## 2022-01-29 RX ADMIN — BUDESONIDE AND FORMOTEROL FUMARATE DIHYDRATE 2 PUFF(S): 160; 4.5 AEROSOL RESPIRATORY (INHALATION) at 10:48

## 2022-01-29 RX ADMIN — Medication 1000 MILLIGRAM(S): at 22:01

## 2022-01-29 RX ADMIN — Medication 20 MILLIGRAM(S): at 16:53

## 2022-01-29 RX ADMIN — CEFTRIAXONE 100 MILLIGRAM(S): 500 INJECTION, POWDER, FOR SOLUTION INTRAMUSCULAR; INTRAVENOUS at 10:47

## 2022-01-29 RX ADMIN — SENNA PLUS 2 TABLET(S): 8.6 TABLET ORAL at 22:01

## 2022-01-29 RX ADMIN — BUDESONIDE AND FORMOTEROL FUMARATE DIHYDRATE 2 PUFF(S): 160; 4.5 AEROSOL RESPIRATORY (INHALATION) at 22:02

## 2022-01-29 NOTE — PROGRESS NOTE ADULT - PROBLEM SELECTOR PLAN 1
Presenting after a fall   hip xray: left hip fracture  Ortho consulted by ED  Cardiac clearance   Pain control with tylenol   NPO except meds  Palliative Dr. Taylor consulted

## 2022-01-29 NOTE — PROGRESS NOTE ADULT - ATTENDING COMMENTS
I called pt's daughter Jamila.  The family and te pt now wants to have surgery.  We discussed the pros and cons of the surgery - Hemiarthroplasty  I told her if we have clearance from the cardiologist and medicine team, the H/H is high enough, and the pt and family accept the risks of surgery, we can do surgery early next week. I called pt's daughter Jamila (She told me the actual age of the pt is 89 yo due to a prior paper work error by pt's ).  The family and te pt now wants to have surgery.  We discussed the pros and cons of the surgery - Hemiarthroplasty  I told her if we have clearance from the cardiologist and medicine team, the H/H is high enough, and the pt and family accept the risks of surgery, we can do surgery early next week.

## 2022-01-29 NOTE — CHART NOTE - NSCHARTNOTEFT_GEN_A_CORE
EVENT: notified by RN that pt is agreeable for surgery   Pt seen and examined using [employer] video  Tc [id number], pt sitting up in bed eating breakfast     OBJECTIVE:  Vital Signs Last 24 Hrs  T(C): 37 (29 Jan 2022 05:21), Max: 37.4 (28 Jan 2022 21:35)  T(F): 98.6 (29 Jan 2022 05:21), Max: 99.3 (28 Jan 2022 21:35)  HR: 64 (29 Jan 2022 05:21) (64 - 87)  BP: 115/46 (29 Jan 2022 05:21) (108/49 - 115/46)  BP(mean): --  RR: 19 (29 Jan 2022 05:21) (18 - 20)  SpO2: 99% (29 Jan 2022 05:21) (99% - 100%)    REVIEW OF SYSTEMS:  NECK: No pain or stiffness  RESPIRATORY: No cough, SOB  CARDIOVASCULAR: No chest pain, palpitations  GASTROINTESTINAL: No abdominal pain  MUSCULOSKELETAL: left hip pain     PHYSICAL EXAM:  GENERAL: frail elderly   NECK: Supple, No JVD  CHEST/LUNG: Clear to diminished  bilaterally; No rales, rhonchi, wheezing, or rubs  HEART: S1, S2, Regular rate and rhythm  ABDOMEN: Soft, Nontender, Nondistended; Bowel sounds present  NEURO: Alert & Oriented to person and place, disoriented to time, aware that she has fracture and need the surgery   EXTREMITIES: +ve left hip pain and limited ROM   : grimaldo with clear urine     LABS:                        6.7    5.64  )-----------( 215      ( 28 Jan 2022 06:23 )             23.1     01-28    137  |  106  |  25<H>  ----------------------------<  134<H>  4.2   |  27  |  1.37<H>    Ca    8.3<L>      28 Jan 2022 06:23interpretor   Mg     2.4     01-28    TPro  6.9  /  Alb  2.5<L>  /  TBili  0.7  /  DBili  x   /  AST  28  /  ALT  26  /  AlkPhos  115  01-28    < from: Xray Hip 2-3 Views, Left (01.28.22 @ 03:04) >    PROCEDURE DATE:  01/28/2022          INTERPRETATION:  Clinical history: 89-year-old female, fall.    Two views of the left hip and AP view of the pelvis demonstrate a left   basicervical impacted femoral fracture. No other acute findings.    IMPRESSION:  Impacted left basicervical fracture    < end of copied text >        A/P: 89 year old female PMH COPD, CHF, HTN, hypothyroid coming in s/p fall with left hip pain.     Acute left hip fracture:   -s/p fall, Xray showed Impacted left basicervical fracture  -pt and family initially refused surgery based on pt's wishes, Spoke to pt at bedside, pt stated that she is agreeable for surgery because " I want to walk again".   -medical optimization in progress   -Ortho follow up   -cont pain mgmt     Severe Anemia:   -acute on chronic anemia, no acute sign and symptoms of bleeding   -transfuse 2 PRBCs over 4hr each   -cont Lasix   -mon CBC  -transfusion consent obtained from pt's daughter Jamila Hurley    Spoke to pt's daughter, updated on clinical status, treatment plan/options, family agreeable for surgical interventions as per pt's wishes.   Above d/w Dr. Hammer

## 2022-01-29 NOTE — PROGRESS NOTE ADULT - SUBJECTIVE AND OBJECTIVE BOX
JUAN DIEGO SANFORD  MRN-[id number]    Diagnosis:  L Hip Femoral Neck Fracture    89yFemale    Patient was seen and evaluated at bedside. Cali  ID#[id number]- Pt is now agreeable to surgery and understands all the risks and benefits. She is currently receiving blood.   Pain is controlled to the LLE and comes on only with motion.   Denies CP/SOB, dyspnea, paresthesias, N/V/D, palpitations.     Vital Signs Last 24 Hrs  T(C): 37 (2022 05:21), Max: 37.4 (2022 21:35)  T(F): 98.6 (2022 05:21), Max: 99.3 (2022 21:35)  HR: 64 (2022 05:21) (64 - 87)  BP: 115/46 (2022 05:21) (108/49 - 115/46)  BP(mean): --  RR: 19 (2022 05:21) (18 - 20)  SpO2: 99% (2022 05:21) (99% - 100%)  I&O's Detail    2022 07:01  -  2022 07:00  --------------------------------------------------------  IN:  Total IN: 0 mL    OUT:    Indwelling Catheter - Urethral (mL): 600 mL  Total OUT: 600 mL    Total NET: -600 mL          Physical Exam:    General: NAD, resting comfortably in bed.    L Hip:   Skin is pink and warm. Tender at the fracture site. Decreased ROM of the affected hip due to pain. SILT. Positive logroll test.  Lower extremity:  No calf tenderness, calves are soft. 2+pulses. NVI. (+)EHL/FHL/ADF/APF.  Good capillary refill. Warm, well-perfused.                           6.2    7.08  )-----------( 214      ( 2022 12:38 )             21.1     -    139  |  106  |  45<H>  ----------------------------<  115<H>  4.7   |  27  |  1.79<H>    Ca    7.9<L>      2022 12:38  Phos  3.9       Mg     2.4         TPro  6.5  /  Alb  2.3<L>  /  TBili  0.5  /  DBili  x   /  AST  22  /  ALT  27  /  AlkPhos  95      PT/INR - ( 2022 01:43 )   PT: 13.0 sec;   INR: 1.10 ratio         PTT - ( 2022 01:43 )  PTT:35.9 sec  Urinalysis Basic - ( 2022 02:13 )    Color: Yellow / Appearance: Clear / S.020 / pH: x  Gluc: x / Ketone: Negative  / Bili: Negative / Urobili: Negative   Blood: x / Protein: 15 / Nitrite: Positive   Leuk Esterase: Negative / RBC: 2-5 /HPF / WBC 0-2 /HPF   Sq Epi: x / Non Sq Epi: Few /HPF / Bacteria: Few /HPF        Impression:  89 y/o Female with L Hip Femoral Neck Fracture    Plan:  -  D/w patient- she is now agreeable to left hip surgery- will proceed with surgery once patient optimized  -  Pain control  -  DVT prophylaxis  -  Procedure:  Left hip hemiarthroplasty  -  Medical Optimization  -  Consent: Pending  -  NWB to LLE  -  Case d/w Dr. Wright- who will discuss the case with patients daughter as well  -  Case d/w medical team

## 2022-01-30 LAB
ALBUMIN SERPL ELPH-MCNC: 2.4 G/DL — LOW (ref 3.5–5)
ALP SERPL-CCNC: 96 U/L — SIGNIFICANT CHANGE UP (ref 40–120)
ALT FLD-CCNC: 26 U/L DA — SIGNIFICANT CHANGE UP (ref 10–60)
ANION GAP SERPL CALC-SCNC: 7 MMOL/L — SIGNIFICANT CHANGE UP (ref 5–17)
AST SERPL-CCNC: 21 U/L — SIGNIFICANT CHANGE UP (ref 10–40)
BASOPHILS # BLD AUTO: 0.05 K/UL — SIGNIFICANT CHANGE UP (ref 0–0.2)
BASOPHILS NFR BLD AUTO: 0.7 % — SIGNIFICANT CHANGE UP (ref 0–2)
BILIRUB SERPL-MCNC: 1.1 MG/DL — SIGNIFICANT CHANGE UP (ref 0.2–1.2)
BUN SERPL-MCNC: 43 MG/DL — HIGH (ref 7–18)
CALCIUM SERPL-MCNC: 7.8 MG/DL — LOW (ref 8.4–10.5)
CHLORIDE SERPL-SCNC: 107 MMOL/L — SIGNIFICANT CHANGE UP (ref 96–108)
CO2 SERPL-SCNC: 28 MMOL/L — SIGNIFICANT CHANGE UP (ref 22–31)
CREAT SERPL-MCNC: 1.62 MG/DL — HIGH (ref 0.5–1.3)
EOSINOPHIL # BLD AUTO: 0.04 K/UL — SIGNIFICANT CHANGE UP (ref 0–0.5)
EOSINOPHIL NFR BLD AUTO: 0.6 % — SIGNIFICANT CHANGE UP (ref 0–6)
GLUCOSE BLDC GLUCOMTR-MCNC: 103 MG/DL — HIGH (ref 70–99)
GLUCOSE BLDC GLUCOMTR-MCNC: 90 MG/DL — SIGNIFICANT CHANGE UP (ref 70–99)
GLUCOSE BLDC GLUCOMTR-MCNC: 91 MG/DL — SIGNIFICANT CHANGE UP (ref 70–99)
GLUCOSE BLDC GLUCOMTR-MCNC: 92 MG/DL — SIGNIFICANT CHANGE UP (ref 70–99)
GLUCOSE SERPL-MCNC: 131 MG/DL — HIGH (ref 70–99)
HCT VFR BLD CALC: 29 % — LOW (ref 34.5–45)
HGB BLD-MCNC: 9.1 G/DL — LOW (ref 11.5–15.5)
IMM GRANULOCYTES NFR BLD AUTO: 0.3 % — SIGNIFICANT CHANGE UP (ref 0–1.5)
LYMPHOCYTES # BLD AUTO: 1.21 K/UL — SIGNIFICANT CHANGE UP (ref 1–3.3)
LYMPHOCYTES # BLD AUTO: 16.9 % — SIGNIFICANT CHANGE UP (ref 13–44)
MCHC RBC-ENTMCNC: 22.5 PG — LOW (ref 27–34)
MCHC RBC-ENTMCNC: 31.4 GM/DL — LOW (ref 32–36)
MCV RBC AUTO: 71.8 FL — LOW (ref 80–100)
MONOCYTES # BLD AUTO: 0.72 K/UL — SIGNIFICANT CHANGE UP (ref 0–0.9)
MONOCYTES NFR BLD AUTO: 10 % — SIGNIFICANT CHANGE UP (ref 2–14)
NEUTROPHILS # BLD AUTO: 5.14 K/UL — SIGNIFICANT CHANGE UP (ref 1.8–7.4)
NEUTROPHILS NFR BLD AUTO: 71.5 % — SIGNIFICANT CHANGE UP (ref 43–77)
NRBC # BLD: 0 /100 WBCS — SIGNIFICANT CHANGE UP (ref 0–0)
PLATELET # BLD AUTO: 198 K/UL — SIGNIFICANT CHANGE UP (ref 150–400)
POTASSIUM SERPL-MCNC: 3.9 MMOL/L — SIGNIFICANT CHANGE UP (ref 3.5–5.3)
POTASSIUM SERPL-SCNC: 3.9 MMOL/L — SIGNIFICANT CHANGE UP (ref 3.5–5.3)
PROT SERPL-MCNC: 6.6 G/DL — SIGNIFICANT CHANGE UP (ref 6–8.3)
RBC # BLD: 4.04 M/UL — SIGNIFICANT CHANGE UP (ref 3.8–5.2)
RBC # FLD: 21.2 % — HIGH (ref 10.3–14.5)
SODIUM SERPL-SCNC: 142 MMOL/L — SIGNIFICANT CHANGE UP (ref 135–145)
WBC # BLD: 7.18 K/UL — SIGNIFICANT CHANGE UP (ref 3.8–10.5)
WBC # FLD AUTO: 7.18 K/UL — SIGNIFICANT CHANGE UP (ref 3.8–10.5)

## 2022-01-30 RX ADMIN — Medication 1000 MILLIGRAM(S): at 21:33

## 2022-01-30 RX ADMIN — Medication 1000 MILLIGRAM(S): at 05:41

## 2022-01-30 RX ADMIN — Medication 1000 MILLIGRAM(S): at 13:45

## 2022-01-30 RX ADMIN — Medication 300 MILLIGRAM(S): at 17:02

## 2022-01-30 RX ADMIN — HEPARIN SODIUM 5000 UNIT(S): 5000 INJECTION INTRAVENOUS; SUBCUTANEOUS at 13:02

## 2022-01-30 RX ADMIN — CEFTRIAXONE 100 MILLIGRAM(S): 500 INJECTION, POWDER, FOR SOLUTION INTRAMUSCULAR; INTRAVENOUS at 10:06

## 2022-01-30 RX ADMIN — SENNA PLUS 2 TABLET(S): 8.6 TABLET ORAL at 21:03

## 2022-01-30 RX ADMIN — AMLODIPINE BESYLATE 5 MILLIGRAM(S): 2.5 TABLET ORAL at 05:41

## 2022-01-30 RX ADMIN — BUDESONIDE AND FORMOTEROL FUMARATE DIHYDRATE 2 PUFF(S): 160; 4.5 AEROSOL RESPIRATORY (INHALATION) at 09:44

## 2022-01-30 RX ADMIN — Medication 1000 MILLIGRAM(S): at 06:10

## 2022-01-30 RX ADMIN — Medication 1000 MILLIGRAM(S): at 13:02

## 2022-01-30 RX ADMIN — Medication 25 MICROGRAM(S): at 05:41

## 2022-01-30 RX ADMIN — BUDESONIDE AND FORMOTEROL FUMARATE DIHYDRATE 2 PUFF(S): 160; 4.5 AEROSOL RESPIRATORY (INHALATION) at 21:02

## 2022-01-30 RX ADMIN — MIRTAZAPINE 7.5 MILLIGRAM(S): 45 TABLET, ORALLY DISINTEGRATING ORAL at 21:03

## 2022-01-30 RX ADMIN — Medication 300 MILLIGRAM(S): at 05:42

## 2022-01-30 RX ADMIN — Medication 25 MILLIGRAM(S): at 05:41

## 2022-01-30 RX ADMIN — Medication 25 MILLIGRAM(S): at 17:02

## 2022-01-30 RX ADMIN — HEPARIN SODIUM 5000 UNIT(S): 5000 INJECTION INTRAVENOUS; SUBCUTANEOUS at 05:41

## 2022-01-30 RX ADMIN — Medication 1000 MILLIGRAM(S): at 21:03

## 2022-01-30 RX ADMIN — Medication 40 MILLIGRAM(S): at 05:41

## 2022-01-30 RX ADMIN — Medication 40 MILLIGRAM(S): at 17:02

## 2022-01-30 NOTE — PROGRESS NOTE ADULT - SUBJECTIVE AND OBJECTIVE BOX
PATIENT SEEN AND EXAMINED ON :- 1/30/2022  DATE OF SERVICE:  1/30/2022         Interim events noted,Labs ,Radiological studies and Cardiology tests reviewed .    Patient is a 89y old  Female who presents with a chief complaint of fracture (29 Jan 2022 13:33)      HPI:  89 year old female PMH COPD, CHF, HTN, hypothyroid coming in s/p fall with left hip pain. Used [employer]  # [id number] but patient was  was unable to hear the patient as she was speaking very low. Called daughter, Jamila, who stated she was told her mother fell in the bathroom at the NH for which she was brought to the ED. She states her mother has chronic anemia and does not want to prolong her suffering so refuses blood transfusions. She is tired of living in pain and has made it clear to family that she wants to be DNR/DNI. Molst form in NH papers.  (28 Jan 2022 03:37)      PAST MEDICAL & SURGICAL HISTORY:  HTN (hypertension)    Hypothyroid    COPD (chronic obstructive pulmonary disease)    Heart failure        PREVIOUS DIAGNOSTIC TESTING:      ECHO  FINDINGS:    STRESS  FINDINGS:    CATHETERIZATION  FINDINGS:    MEDICATIONS  (STANDING):  acetaminophen     Tablet .. 1000 milliGRAM(s) Oral every 8 hours  amLODIPine   Tablet 5 milliGRAM(s) Oral daily  budesonide  80 MICROgram(s)/formoterol 4.5 MICROgram(s) Inhaler 2 Puff(s) Inhalation two times a day  cefTRIAXone   IVPB      cefTRIAXone   IVPB 1000 milliGRAM(s) IV Intermittent every 24 hours  ferrous    sulfate Liquid 300 milliGRAM(s) Oral every 12 hours  furosemide    Tablet 40 milliGRAM(s) Oral two times a day  heparin   Injectable 5000 Unit(s) SubCutaneous every 8 hours  insulin lispro (ADMELOG) corrective regimen sliding scale   SubCutaneous every 6 hours  levothyroxine 25 MICROGram(s) Oral daily  metoprolol tartrate 25 milliGRAM(s) Oral two times a day  mirtazapine 7.5 milliGRAM(s) Oral at bedtime  senna 2 Tablet(s) Oral at bedtime    MEDICATIONS  (PRN):  oxyCODONE    IR 2.5 milliGRAM(s) Oral every 4 hours PRN Moderate Pain (4 - 6)  oxyCODONE    IR 5 milliGRAM(s) Oral every 4 hours PRN Severe Pain (7 - 10)      FAMILY HISTORY:      SOCIAL HISTORY:    CIGARETTES:    ALCOHOL:    REVIEW OF SYSTEMS:  CONSTITUTIONAL: No fever, weight loss, or fatigue  EYES: No eye pain, visual disturbances, or discharge  ENMT:  No difficulty hearing, tinnitus, vertigo; No sinus or throat pain  NECK: No pain or stiffness  RESPIRATORY: No cough, wheezing, chills or hemoptysis; No shortness of breath  CARDIOVASCULAR: No chest pain, palpitations, dizziness, or leg swelling  GASTROINTESTINAL: No abdominal or epigastric pain. No nausea, vomiting, or hematemesis; No diarrhea or constipation. No melena or hematochezia.  GENITOURINARY: No dysuria, frequency, hematuria, or incontinence  NEUROLOGICAL: No headaches, memory loss, loss of strength, numbness, or tremors  SKIN: No itching, burning, rashes, or lesions   LYMPH NODES: No enlarged glands  ENDOCRINE: No heat or cold intolerance; No hair loss  MUSCULOSKELETAL: No joint pain or swelling; No muscle, back, or extremity pain  PSYCHIATRIC: No depression, anxiety, mood swings, or difficulty sleeping  HEME/LYMPH: No easy bruising, or bleeding gums  ALLERY AND IMMUNOLOGIC: No hives or eczema    Vital Signs Last 24 Hrs  T(C): 36.9 (30 Jan 2022 13:45), Max: 36.9 (30 Jan 2022 13:45)  T(F): 98.5 (30 Jan 2022 13:45), Max: 98.5 (30 Jan 2022 13:45)  HR: 64 (30 Jan 2022 13:45) (64 - 82)  BP: 125/63 (30 Jan 2022 13:45) (118/67 - 150/69)  BP(mean): 77 (29 Jan 2022 21:04) (77 - 77)  RR: 16 (30 Jan 2022 13:45) (15 - 18)  SpO2: 99% (30 Jan 2022 13:45) (99% - 100%)      PHYSICAL EXAM:  GENERAL: NAD, well-groomed, well-developed  HEAD:  Atraumatic, Normocephalic  EYES: EOMI, PERRLA, conjunctiva and sclera clear  ENMT: No tonsillar erythema, exudates, or enlargement; Moist mucous membranes, Good dentition, No lesions  NECK: Supple, No JVD, Normal thyroid  NERVOUS SYSTEM:  Alert & Oriented X3, Good concentration; Motor Strength 5/5 B/L upper and lower extremities; DTRs 2+ intact and symmetric  CHEST/LUNG: Clear to percussion bilaterally; No rales, rhonchi, wheezing, or rubs  HEART: Regular rate and rhythm; No murmurs, rubs, or gallops  ABDOMEN: Soft, Nontender, Nondistended; Bowel sounds present  EXTREMITIES:  2+ Peripheral Pulses, No clubbing, cyanosis, or edema  LYMPH: No lymphadenopathy noted  SKIN: No rashes or lesions      INTERPRETATION OF TELEMETRY:    EC[facility]    [facility]     LABS:                        9.1    7.18  )-----------( 198      ( 30 Jan 2022 08:51 )             29.0     01-30    142  |  107  |  43<H>  ----------------------------<  131<H>  3.9   |  28  |  1.62<H>    Ca    7.8<L>      30 Jan 2022 08:51    TPro  6.6  /  Alb  2.4<L>  /  TBili  1.1  /  DBili  x   /  AST  21  /  ALT  26  /  AlkPhos  96  01-30            Lipid Panel:   I&O's Summary    29 Jan 2022 07:01  -  30 Jan 2022 07:00  --------------------------------------------------------  IN: 0 mL / OUT: 1300 mL / NET: -1300 mL    30 Jan 2022 07:01  -  30 Jan 2022 16:37  --------------------------------------------------------  IN: 0 mL / OUT: 800 mL / NET: -800 mL        RADIOLOGY & ADDITIONAL STUDIES:

## 2022-01-31 DIAGNOSIS — L89.151 PRESSURE ULCER OF SACRAL REGION, STAGE 1: ICD-10-CM

## 2022-01-31 LAB
-  AMIKACIN: SIGNIFICANT CHANGE UP
-  AMOXICILLIN/CLAVULANIC ACID: SIGNIFICANT CHANGE UP
-  AMPICILLIN/SULBACTAM: SIGNIFICANT CHANGE UP
-  AMPICILLIN: SIGNIFICANT CHANGE UP
-  AZTREONAM: SIGNIFICANT CHANGE UP
-  CEFAZOLIN: SIGNIFICANT CHANGE UP
-  CEFEPIME: SIGNIFICANT CHANGE UP
-  CEFOXITIN: SIGNIFICANT CHANGE UP
-  CEFTRIAXONE: SIGNIFICANT CHANGE UP
-  CIPROFLOXACIN: SIGNIFICANT CHANGE UP
-  ERTAPENEM: SIGNIFICANT CHANGE UP
-  GENTAMICIN: SIGNIFICANT CHANGE UP
-  IMIPENEM: SIGNIFICANT CHANGE UP
-  LEVOFLOXACIN: SIGNIFICANT CHANGE UP
-  MEROPENEM: SIGNIFICANT CHANGE UP
-  NITROFURANTOIN: SIGNIFICANT CHANGE UP
-  PIPERACILLIN/TAZOBACTAM: SIGNIFICANT CHANGE UP
-  TIGECYCLINE: SIGNIFICANT CHANGE UP
-  TOBRAMYCIN: SIGNIFICANT CHANGE UP
-  TRIMETHOPRIM/SULFAMETHOXAZOLE: SIGNIFICANT CHANGE UP
ALBUMIN SERPL ELPH-MCNC: 2.2 G/DL — LOW (ref 3.5–5)
ALP SERPL-CCNC: 92 U/L — SIGNIFICANT CHANGE UP (ref 40–120)
ALT FLD-CCNC: 21 U/L DA — SIGNIFICANT CHANGE UP (ref 10–60)
ANION GAP SERPL CALC-SCNC: 7 MMOL/L — SIGNIFICANT CHANGE UP (ref 5–17)
ANISOCYTOSIS BLD QL: SLIGHT — SIGNIFICANT CHANGE UP
APTT BLD: 36.6 SEC — HIGH (ref 27.5–35.5)
AST SERPL-CCNC: 20 U/L — SIGNIFICANT CHANGE UP (ref 10–40)
BASOPHILS # BLD AUTO: 0.06 K/UL — SIGNIFICANT CHANGE UP (ref 0–0.2)
BASOPHILS NFR BLD AUTO: 0.9 % — SIGNIFICANT CHANGE UP (ref 0–2)
BILIRUB SERPL-MCNC: 1.2 MG/DL — SIGNIFICANT CHANGE UP (ref 0.2–1.2)
BUN SERPL-MCNC: 37 MG/DL — HIGH (ref 7–18)
CALCIUM SERPL-MCNC: 8 MG/DL — LOW (ref 8.4–10.5)
CHLORIDE SERPL-SCNC: 107 MMOL/L — SIGNIFICANT CHANGE UP (ref 96–108)
CO2 SERPL-SCNC: 28 MMOL/L — SIGNIFICANT CHANGE UP (ref 22–31)
CREAT SERPL-MCNC: 1.42 MG/DL — HIGH (ref 0.5–1.3)
CULTURE RESULTS: SIGNIFICANT CHANGE UP
EOSINOPHIL # BLD AUTO: 0.03 K/UL — SIGNIFICANT CHANGE UP (ref 0–0.5)
EOSINOPHIL NFR BLD AUTO: 0.4 % — SIGNIFICANT CHANGE UP (ref 0–6)
GLUCOSE BLDC GLUCOMTR-MCNC: 106 MG/DL — HIGH (ref 70–99)
GLUCOSE BLDC GLUCOMTR-MCNC: 112 MG/DL — HIGH (ref 70–99)
GLUCOSE BLDC GLUCOMTR-MCNC: 84 MG/DL — SIGNIFICANT CHANGE UP (ref 70–99)
GLUCOSE SERPL-MCNC: 93 MG/DL — SIGNIFICANT CHANGE UP (ref 70–99)
HCT VFR BLD CALC: 29 % — LOW (ref 34.5–45)
HGB BLD-MCNC: 9.3 G/DL — LOW (ref 11.5–15.5)
HYPOCHROMIA BLD QL: SLIGHT — SIGNIFICANT CHANGE UP
IMM GRANULOCYTES NFR BLD AUTO: 0.1 % — SIGNIFICANT CHANGE UP (ref 0–1.5)
INR BLD: 1.12 RATIO — SIGNIFICANT CHANGE UP (ref 0.88–1.16)
LYMPHOCYTES # BLD AUTO: 0.95 K/UL — LOW (ref 1–3.3)
LYMPHOCYTES # BLD AUTO: 13.7 % — SIGNIFICANT CHANGE UP (ref 13–44)
MAGNESIUM SERPL-MCNC: 2.4 MG/DL — SIGNIFICANT CHANGE UP (ref 1.6–2.6)
MANUAL SMEAR VERIFICATION: SIGNIFICANT CHANGE UP
MCHC RBC-ENTMCNC: 22.5 PG — LOW (ref 27–34)
MCHC RBC-ENTMCNC: 32.1 GM/DL — SIGNIFICANT CHANGE UP (ref 32–36)
MCV RBC AUTO: 70 FL — LOW (ref 80–100)
METHOD TYPE: SIGNIFICANT CHANGE UP
MICROCYTES BLD QL: SLIGHT — SIGNIFICANT CHANGE UP
MONOCYTES # BLD AUTO: 0.91 K/UL — HIGH (ref 0–0.9)
MONOCYTES NFR BLD AUTO: 13.1 % — SIGNIFICANT CHANGE UP (ref 2–14)
NEUTROPHILS # BLD AUTO: 4.98 K/UL — SIGNIFICANT CHANGE UP (ref 1.8–7.4)
NEUTROPHILS NFR BLD AUTO: 71.8 % — SIGNIFICANT CHANGE UP (ref 43–77)
NRBC # BLD: 0 /100 WBCS — SIGNIFICANT CHANGE UP (ref 0–0)
ORGANISM # SPEC MICROSCOPIC CNT: SIGNIFICANT CHANGE UP
ORGANISM # SPEC MICROSCOPIC CNT: SIGNIFICANT CHANGE UP
PHOSPHATE SERPL-MCNC: 3.1 MG/DL — SIGNIFICANT CHANGE UP (ref 2.5–4.5)
PLAT MORPH BLD: NORMAL — SIGNIFICANT CHANGE UP
PLATELET # BLD AUTO: 209 K/UL — SIGNIFICANT CHANGE UP (ref 150–400)
POIKILOCYTOSIS BLD QL AUTO: SLIGHT — SIGNIFICANT CHANGE UP
POLYCHROMASIA BLD QL SMEAR: SLIGHT — SIGNIFICANT CHANGE UP
POTASSIUM SERPL-MCNC: 3.6 MMOL/L — SIGNIFICANT CHANGE UP (ref 3.5–5.3)
POTASSIUM SERPL-SCNC: 3.6 MMOL/L — SIGNIFICANT CHANGE UP (ref 3.5–5.3)
PROT SERPL-MCNC: 6.4 G/DL — SIGNIFICANT CHANGE UP (ref 6–8.3)
PROTHROM AB SERPL-ACNC: 13.2 SEC — SIGNIFICANT CHANGE UP (ref 10.6–13.6)
RBC # BLD: 4.14 M/UL — SIGNIFICANT CHANGE UP (ref 3.8–5.2)
RBC # FLD: 22.5 % — HIGH (ref 10.3–14.5)
RBC BLD AUTO: ABNORMAL
SARS-COV-2 RNA SPEC QL NAA+PROBE: SIGNIFICANT CHANGE UP
SODIUM SERPL-SCNC: 142 MMOL/L — SIGNIFICANT CHANGE UP (ref 135–145)
SPECIMEN SOURCE: SIGNIFICANT CHANGE UP
WBC # BLD: 6.94 K/UL — SIGNIFICANT CHANGE UP (ref 3.8–10.5)
WBC # FLD AUTO: 6.94 K/UL — SIGNIFICANT CHANGE UP (ref 3.8–10.5)

## 2022-01-31 PROCEDURE — 88311 DECALCIFY TISSUE: CPT | Mod: 26

## 2022-01-31 PROCEDURE — 72170 X-RAY EXAM OF PELVIS: CPT | Mod: 26

## 2022-01-31 PROCEDURE — 99232 SBSQ HOSP IP/OBS MODERATE 35: CPT

## 2022-01-31 PROCEDURE — 88305 TISSUE EXAM BY PATHOLOGIST: CPT | Mod: 26

## 2022-01-31 DEVICE — IMPLANTABLE DEVICE: Type: IMPLANTABLE DEVICE | Site: LEFT | Status: FUNCTIONAL

## 2022-01-31 DEVICE — FEMORAL BONE CEMENT KIT: Type: IMPLANTABLE DEVICE | Site: LEFT | Status: FUNCTIONAL

## 2022-01-31 DEVICE — NECK ANGLE HIP STEM: Type: IMPLANTABLE DEVICE | Site: LEFT | Status: FUNCTIONAL

## 2022-01-31 DEVICE — HEAD FEM BI-P UHR 26MM 42MM: Type: IMPLANTABLE DEVICE | Site: LEFT | Status: FUNCTIONAL

## 2022-01-31 DEVICE — HEAD FEM LFIT V40 26MM -3MM NK: Type: IMPLANTABLE DEVICE | Site: LEFT | Status: FUNCTIONAL

## 2022-01-31 RX ORDER — HEPARIN SODIUM 5000 [USP'U]/ML
5000 INJECTION INTRAVENOUS; SUBCUTANEOUS EVERY 8 HOURS
Refills: 0 | Status: DISCONTINUED | OUTPATIENT
Start: 2022-02-01 | End: 2022-02-02

## 2022-01-31 RX ORDER — AMLODIPINE BESYLATE 2.5 MG/1
5 TABLET ORAL DAILY
Refills: 0 | Status: DISCONTINUED | OUTPATIENT
Start: 2022-01-31 | End: 2022-02-02

## 2022-01-31 RX ORDER — ACETAMINOPHEN 500 MG
1000 TABLET ORAL EVERY 6 HOURS
Refills: 0 | Status: DISCONTINUED | OUTPATIENT
Start: 2022-01-31 | End: 2022-02-02

## 2022-01-31 RX ORDER — POLYETHYLENE GLYCOL 3350 17 G/17G
17 POWDER, FOR SOLUTION ORAL DAILY
Refills: 0 | Status: DISCONTINUED | OUTPATIENT
Start: 2022-01-31 | End: 2022-02-02

## 2022-01-31 RX ORDER — ACETAMINOPHEN 500 MG
1000 TABLET ORAL ONCE
Refills: 0 | Status: COMPLETED | OUTPATIENT
Start: 2022-01-31 | End: 2022-01-31

## 2022-01-31 RX ORDER — BUDESONIDE AND FORMOTEROL FUMARATE DIHYDRATE 160; 4.5 UG/1; UG/1
2 AEROSOL RESPIRATORY (INHALATION)
Refills: 0 | Status: DISCONTINUED | OUTPATIENT
Start: 2022-01-31 | End: 2022-02-02

## 2022-01-31 RX ORDER — FENTANYL CITRATE 50 UG/ML
50 INJECTION INTRAVENOUS
Refills: 0 | Status: DISCONTINUED | OUTPATIENT
Start: 2022-01-31 | End: 2022-01-31

## 2022-01-31 RX ORDER — METOPROLOL TARTRATE 50 MG
25 TABLET ORAL
Refills: 0 | Status: DISCONTINUED | OUTPATIENT
Start: 2022-01-31 | End: 2022-02-02

## 2022-01-31 RX ORDER — CEFAZOLIN SODIUM 1 G
1000 VIAL (EA) INJECTION EVERY 8 HOURS
Refills: 0 | Status: COMPLETED | OUTPATIENT
Start: 2022-02-01 | End: 2022-02-01

## 2022-01-31 RX ORDER — OXYCODONE HYDROCHLORIDE 5 MG/1
2.5 TABLET ORAL EVERY 4 HOURS
Refills: 0 | Status: DISCONTINUED | OUTPATIENT
Start: 2022-01-31 | End: 2022-02-02

## 2022-01-31 RX ORDER — FUROSEMIDE 40 MG
40 TABLET ORAL
Refills: 0 | Status: DISCONTINUED | OUTPATIENT
Start: 2022-01-31 | End: 2022-02-02

## 2022-01-31 RX ORDER — MAGNESIUM HYDROXIDE 400 MG/1
30 TABLET, CHEWABLE ORAL DAILY
Refills: 0 | Status: DISCONTINUED | OUTPATIENT
Start: 2022-01-31 | End: 2022-02-02

## 2022-01-31 RX ORDER — SODIUM CHLORIDE 9 MG/ML
1000 INJECTION, SOLUTION INTRAVENOUS
Refills: 0 | Status: DISCONTINUED | OUTPATIENT
Start: 2022-01-31 | End: 2022-01-31

## 2022-01-31 RX ORDER — LEVOTHYROXINE SODIUM 125 MCG
25 TABLET ORAL DAILY
Refills: 0 | Status: DISCONTINUED | OUTPATIENT
Start: 2022-01-31 | End: 2022-02-02

## 2022-01-31 RX ORDER — FENTANYL CITRATE 50 UG/ML
25 INJECTION INTRAVENOUS
Refills: 0 | Status: DISCONTINUED | OUTPATIENT
Start: 2022-01-31 | End: 2022-01-31

## 2022-01-31 RX ORDER — MIRTAZAPINE 45 MG/1
7.5 TABLET, ORALLY DISINTEGRATING ORAL AT BEDTIME
Refills: 0 | Status: DISCONTINUED | OUTPATIENT
Start: 2022-01-31 | End: 2022-02-02

## 2022-01-31 RX ORDER — ACETAMINOPHEN 500 MG
1000 TABLET ORAL ONCE
Refills: 0 | Status: DISCONTINUED | OUTPATIENT
Start: 2022-01-31 | End: 2022-01-31

## 2022-01-31 RX ORDER — SENNA PLUS 8.6 MG/1
2 TABLET ORAL AT BEDTIME
Refills: 0 | Status: DISCONTINUED | OUTPATIENT
Start: 2022-01-31 | End: 2022-02-02

## 2022-01-31 RX ADMIN — BUDESONIDE AND FORMOTEROL FUMARATE DIHYDRATE 2 PUFF(S): 160; 4.5 AEROSOL RESPIRATORY (INHALATION) at 11:02

## 2022-01-31 RX ADMIN — Medication 300 MILLIGRAM(S): at 05:22

## 2022-01-31 RX ADMIN — Medication 25 MICROGRAM(S): at 05:22

## 2022-01-31 RX ADMIN — Medication 40 MILLIGRAM(S): at 05:22

## 2022-01-31 RX ADMIN — CEFTRIAXONE 100 MILLIGRAM(S): 500 INJECTION, POWDER, FOR SOLUTION INTRAMUSCULAR; INTRAVENOUS at 10:55

## 2022-01-31 RX ADMIN — Medication 1000 MILLIGRAM(S): at 15:05

## 2022-01-31 RX ADMIN — Medication 25 MILLIGRAM(S): at 05:24

## 2022-01-31 RX ADMIN — AMLODIPINE BESYLATE 5 MILLIGRAM(S): 2.5 TABLET ORAL at 05:22

## 2022-01-31 RX ADMIN — BUDESONIDE AND FORMOTEROL FUMARATE DIHYDRATE 2 PUFF(S): 160; 4.5 AEROSOL RESPIRATORY (INHALATION) at 21:04

## 2022-01-31 RX ADMIN — Medication 1000 MILLIGRAM(S): at 05:23

## 2022-01-31 RX ADMIN — Medication 400 MILLIGRAM(S): at 14:51

## 2022-01-31 NOTE — PROGRESS NOTE ADULT - SUBJECTIVE AND OBJECTIVE BOX
DATE OF SERVICE:  01/31/2022  Patient was seen and examined on 01/31/2022    .Interim events noted.Consultant notes ,Labs,Telemetry reviewed by me    PRESENTING CC:Fall    HPI and HOSPITAL COURSE: HPI:  89 year old female PMH COPD, CHF, HTN, hypothyroid coming in s/p fall with left hip pain. Used [employer]  # [id number] but patient was  was unable to hear the patient as she was speaking very low. Called daughter, Jamila, who stated she was told her mother fell in the bathroom at the NH for which she was brought to the ED.     INTERIM EVENTS:Patient seen at bedside interim events noted.  Now agreeable for surgert recieved PRBC Tx    PMH -reviewed admission note, no change since admission  Heart Failure: Acute [x ] Chronic [ ] Acute on Chronic [x ] Diastolic [ ] Systolic [ ] Combined Systolic and Diastolic[ ]  EWA[ ]  ATN[ ]  CKD I [ ] CKDII [x ] CKD III [ ] CKD IV [ ] CKD V [ ] ESRD[ ]  HTN[ ] CVA[ ] DM[ ] COPD[ ] COVID[ ] AF[ ]  PPM[ ] ICD[ ]    MEDICATIONS  (STANDING):  acetaminophen     Tablet .. 1000 milliGRAM(s) Oral every 8 hours  amLODIPine   Tablet 5 milliGRAM(s) Oral daily  budesonide  80 MICROgram(s)/formoterol 4.5 MICROgram(s) Inhaler 2 Puff(s) Inhalation two times a day  ferrous    sulfate Liquid 300 milliGRAM(s) Oral every 12 hours  furosemide    Tablet 40 milliGRAM(s) Oral two times a day  insulin lispro (ADMELOG) corrective regimen sliding scale   SubCutaneous every 6 hours  levothyroxine 25 MICROGram(s) Oral daily  metoprolol tartrate 25 milliGRAM(s) Oral two times a day  mirtazapine 7.5 milliGRAM(s) Oral at bedtime  senna 2 Tablet(s) Oral at bedtime    MEDICATIONS  (PRN):  oxyCODONE    IR 2.5 milliGRAM(s) Oral every 4 hours PRN Moderate Pain (4 - 6)  oxyCODONE    IR 5 milliGRAM(s) Oral every 4 hours PRN Severe Pain (7 - 10)            REVIEW OF SYSTEMS:  Constitutional: [ ] fever, [ ]weight loss,  [ ]fatigue  Eyes: [ ] visual changes  Respiratory: [ ]shortness of breath;  [ ] cough, [ ]wheezing, [ ]chills, [ ]hemoptysis  Cardiovascular: [ ] chest pain, [ ]palpitations, [ ]dizziness,  [ ]leg swelling[ ]orthopnea[ ]PND  Gastrointestinal: [ ] abdominal pain, [ ]nausea, [ ]vomiting,  [ ]diarrhea [ ]Constipation [ ]Melena  Genitourinary: [ ] dysuria, [ ] hematuria [ ]Martinez  Neurologic: [ ] headaches [ ] tremors[ ]weakness [ ]Paralysis Right[ ] Left[ ]  Skin: [ ] itching, [ ]burning, [ ] rashes  Endocrine: [ ] heat or cold intolerance  Musculoskeletal: [ ] joint pain or swelling; [ ] muscle, back, or extremity pain  Psychiatric: [ ] depression, [ ]anxiety, [ ]mood swings, or [ ]difficulty sleeping  Hematologic: [ ] easy bruising, [ ] bleeding gums    [ ] All remaining systems negative except as per above.   [ ]Unable to obtain.  [x] No change in ROS since admission      Vital Signs Last 24 Hrs  T(C): 37.1 (30 Jan 2022 20:35), Max: 37.1 (30 Jan 2022 20:35)  T(F): 98.8 (30 Jan 2022 20:35), Max: 98.8 (30 Jan 2022 20:35)  HR: 66 (30 Jan 2022 20:35) (64 - 66)  BP: 130/66 (30 Jan 2022 20:35) (125/63 - 130/66)  BP(mean): --  RR: 16 (30 Jan 2022 13:45) (16 - 16)  SpO2: 99% (30 Jan 2022 13:45) (99% - 99%)  I&O's Summary    30 Jan 2022 07:01  -  31 Jan 2022 07:00  --------------------------------------------------------  IN: 0 mL / OUT: 2350 mL / NET: -2350 mL        PHYSICAL EXAM:  General: No acute distress BMI-29  HEENT: EOMI, PERRL  Neck: Supple, [ ] JVD  Lungs: Equal air entry bilaterally; [ ] rales [ ] wheezing [ ] rhonchi  Heart: Regular rate and rhythm; [x ] murmur   3/6  [x ] systolic [ ] diastolic [ ] radiation[ ] rubs [ ]  gallops  Abdomen: Nontender, bowel sounds present  Extremities: No clubbing, cyanosis, [ ] edema [ ]Pulses  equal and intact  Nervous system:  Alert & Oriented X3, no focal deficits  Psychiatric: Normal affect  Skin: No rashes or lesions    LABS:  01-31    142  |  107  |  37<H>  ----------------------------<  93  3.6   |  28  |  1.42<H>    Ca    8.0<L>      31 Jan 2022 07:22  Phos  3.1     01-31  Mg     2.4     01-31    TPro  6.4  /  Alb  2.2<L>  /  TBili  1.2  /  DBili  x   /  AST  20  /  ALT  21  /  AlkPhos  92  01-31    Creatinine Trend: 1.42<--, 1.62<--, 1.79<--, 1.37<--, 1.20<--                        9.3    6.94  )-----------( 209      ( 31 Jan 2022 07:22 )             29.0     PT/INR - ( 31 Jan 2022 07:22 )   PT: 13.2 sec;   INR: 1.12 ratio         PTT - ( 31 Jan 2022 07:22 )  PTT:36.6 sec          ECHO:  Study Date: 1/28/2022CONCLUSIONS:  1. Mitral annular calcification. Trace mitral  regurgitation.  2. Calcified trileaflet aortic valve with decreased  opening. Peak transaortic valve gradient equals 43.6 mm Hg,  mean transaortic valvegradient equals 20 mm Hg, estimated  aortic valve area equals 1 sqcm (by continuity equation),  consistent with severe aortic stenosis.The dimensionless  index is .35. Mild aortic regurgitation.  3. Aortic Root: 3.0 cm.  4. Severely dilated left atrium.  LA volume index = 62  cc/m2.  5. Normal left ventricular internal dimensions and wall  thicknesses.  6. Normal Left Ventricular Systolic Function,  (EF = 55 to  60%) Peak left ventricular outflow tract gradient equals  4.8 mm Hg.  7. Grade II diastolic dysfunction (moderate).  8. Normal right atrium.  9. Normal right ventricular size and systolic function  (TAPSE  2.3cm).  10. RA Pressure is 8 mm Hg.  11. RV systolic pressure is severely increased at  63 mm  Hg.  12. There is mild tricuspid regurgitation.  13. There is mild pulmonic regurgitation.  14. Normal pericardium with no pericardial effusion.

## 2022-01-31 NOTE — PROGRESS NOTE ADULT - TIME BILLING
- Review of records, telemetry, vital signs and daily labs.   - General and cardiovascular physical examination.  - Generation of cardiovascular treatment plan.  - Coordination of care.      Patient was seen and examined by me on 01/31/2022,interim events noted,labs and radiology studies reviewed.  Blake Hammer MD,FACC.  [address] [place], [place] [id number].  [phone]
- Review of records, telemetry, vital signs and daily labs.   - General and cardiovascular physical examination.  - Generation of cardiovascular treatment plan.  - Coordination of care.      Patient was seen and examined by me on 1/29/22, interim events noted,labs and radiology studies reviewed.  Blake Hammer MD,FACC.  [address] [place], [place] [id number].  [phone]
- Review of records, telemetry, vital signs and daily labs.   - General and cardiovascular physical examination.  - Generation of cardiovascular treatment plan.  - Coordination of care.      Patient was seen and examined by me on 1/30/2022,interim events noted,labs and radiology studies reviewed.  Blake Hammer MD,FACC.  [address] [place], [place] [id number].  [phone]
- Review of records, telemetry, vital signs and daily labs.   - General and cardiovascular physical examination.  - Generation of cardiovascular treatment plan.  - Coordination of care.      Patient was seen and examined by me on 1/31/2022,interim events noted,labs and radiology studies reviewed.  Blake Hammer MD,FACC.  [address] [place], [place] [id number].  [phone]

## 2022-01-31 NOTE — PROGRESS NOTE ADULT - PROBLEM SELECTOR PLAN 1
Presented after a fall   - S/p hip xray: left hip fracture  - 1/31 OR for Left hip hemiarthroplasty pending-fu recommendations  - Ortho-Dr. Wright consulted, appreciated   - Pain mgmt consulted, appreciated

## 2022-01-31 NOTE — PROGRESS NOTE ADULT - ATTENDING COMMENTS
Plan for left hip her today.  Called Daughters Jamila and hilary. They consented for the surgery.  Risks discussed: infection, blood clot, dislocation, major organ system failure, etc...

## 2022-01-31 NOTE — PROGRESS NOTE ADULT - SUBJECTIVE AND OBJECTIVE BOX
JUAN DIEGO SANFORD  MRN-[id number]     ORTHOPEDIC CONSULT    Diagnosis:  Left Hip Femoral Neck Fracture    89yFemaleHPI:  Patient was seen and evaluated at bedside. Patient with no acute complaints.   Patient pulled out grimaldo over night  Denies CP/SOB, dyspnea, paresthesias, N/V/D, palpitations.     Vital Signs Last 24 Hrs  T(C): 37.1 (30 Jan 2022 20:35), Max: 37.1 (30 Jan 2022 20:35)  T(F): 98.8 (30 Jan 2022 20:35), Max: 98.8 (30 Jan 2022 20:35)  HR: 66 (30 Jan 2022 20:35) (64 - 66)  BP: 130/66 (30 Jan 2022 20:35) (125/63 - 130/66)  BP(mean): --  RR: 16 (30 Jan 2022 13:45) (16 - 16)  SpO2: 99% (30 Jan 2022 13:45) (99% - 99%)  I&O's Detail    30 Jan 2022 07:01  -  31 Jan 2022 07:00  --------------------------------------------------------  IN:  Total IN: 0 mL    OUT:    Indwelling Catheter - Urethral (mL): 2350 mL  Total OUT: 2350 mL    Total NET: -2350 mL          Physical Exam:    General:  NAD, resting comfortably in bed.    Left Hip:  Skin is pink and warm. Tender at the fracture site. Decreased ROM of the affected hip due to pain. SILT. Positive logroll test.  Lower extremity:  No calf tenderness, calves are soft. 2+pulses. NVI. (+)EHL/FHL/ADF/APF.  Good capillary refill. Warm, well-perfused.                           9.3    6.94  )-----------( 209      ( 31 Jan 2022 07:22 )             29.0     01-30    142  |  107  |  43<H>  ----------------------------<  131<H>  3.9   |  28  |  1.62<H>    Ca    7.8<L>      30 Jan 2022 08:51    TPro  6.6  /  Alb  2.4<L>  /  TBili  1.1  /  DBili  x   /  AST  21  /  ALT  26  /  AlkPhos  96  01-30    PT/INR - ( 31 Jan 2022 07:22 )   PT: 13.2 sec;   INR: 1.12 ratio         PTT - ( 31 Jan 2022 07:22 )  PTT:36.6 sec      Impression:  89yFemale with Left Hip Femoral Neck Fracture    Plan:  -  Pain management  -  Dvt prophylaxis with Venodynes  > Hold all anticoagulation today for surgery   -  Keep NPO today for surgery  -  Surgeon:  Dr. Wright  -  Procedure:  Left hip hemiarthroplasty  -  For Surgery today  -  Consent: Pending  -  Fracture care with bedrest now, NWB of the affected extremity  -  Case d/w DR. Wright

## 2022-01-31 NOTE — PROGRESS NOTE ADULT - SUBJECTIVE AND OBJECTIVE BOX
Source of information: , Chart review  Patient language: English  : n/a    CC:      This is a 89yFemale patient who presents to the hospital for Patient is a 89y old  Female who presents with a chief complaint of fracture (31 Jan 2022 11:15)  .       Patient stated goal for pain control: to be able to take deep breaths, get out of bed to chair and ambulate with tolerable pain control.     PAIN SCORE:         SCALE USED: (1-10 NRS)      PAST MEDICAL & SURGICAL HISTORY:  HTN (hypertension)    Hypothyroid    COPD (chronic obstructive pulmonary disease)    Heart failure        FAMILY HISTORY:        SOCIAL HISTORY:  [ ] Denies Smoking, Alcohol, or Drug Use    Allergies    No Known Allergies    Intolerances        MEDICATIONS:    MEDICATIONS  (STANDING):  acetaminophen     Tablet .. 1000 milliGRAM(s) Oral every 8 hours  amLODIPine   Tablet 5 milliGRAM(s) Oral daily  budesonide  80 MICROgram(s)/formoterol 4.5 MICROgram(s) Inhaler 2 Puff(s) Inhalation two times a day  ferrous    sulfate Liquid 300 milliGRAM(s) Oral every 12 hours  furosemide    Tablet 40 milliGRAM(s) Oral two times a day  insulin lispro (ADMELOG) corrective regimen sliding scale   SubCutaneous every 6 hours  levothyroxine 25 MICROGram(s) Oral daily  metoprolol tartrate 25 milliGRAM(s) Oral two times a day  mirtazapine 7.5 milliGRAM(s) Oral at bedtime  senna 2 Tablet(s) Oral at bedtime    MEDICATIONS  (PRN):  oxyCODONE    IR 2.5 milliGRAM(s) Oral every 4 hours PRN Moderate Pain (4 - 6)  oxyCODONE    IR 5 milliGRAM(s) Oral every 4 hours PRN Severe Pain (7 - 10)      Vital Signs Last 24 Hrs  T(C): 37.1 (30 Jan 2022 20:35), Max: 37.1 (30 Jan 2022 20:35)  T(F): 98.8 (30 Jan 2022 20:35), Max: 98.8 (30 Jan 2022 20:35)  HR: 66 (30 Jan 2022 20:35) (66 - 66)  BP: 130/66 (30 Jan 2022 20:35) (130/66 - 130/66)  BP(mean): --  RR: --  SpO2: --    LABS:                          9.3    6.94  )-----------( 209      ( 31 Jan 2022 07:22 )             29.0     01-31    142  |  107  |  37<H>  ----------------------------<  93  3.6   |  28  |  1.42<H>    Ca    8.0<L>      31 Jan 2022 07:22  Phos  3.1     01-31  Mg     2.4     01-31    TPro  6.4  /  Alb  2.2<L>  /  TBili  1.2  /  DBili  x   /  AST  20  /  ALT  21  /  AlkPhos  92  01-31    PT/INR - ( 31 Jan 2022 07:22 )   PT: 13.2 sec;   INR: 1.12 ratio         PTT - ( 31 Jan 2022 07:22 )  PTT:36.6 sec  LIVER FUNCTIONS - ( 31 Jan 2022 07:22 )  Alb: 2.2 g/dL / Pro: 6.4 g/dL / ALK PHOS: 92 U/L / ALT: 21 U/L DA / AST: 20 U/L / GGT: x             CAPILLARY BLOOD GLUCOSE      POCT Blood Glucose.: 112 mg/dL (31 Jan 2022 11:31)  POCT Blood Glucose.: 106 mg/dL (31 Jan 2022 06:05)  POCT Blood Glucose.: 91 mg/dL (30 Jan 2022 23:29)      Radiology:    Drug Screen:        REVIEW OF SYSTEMS:  CONSTITUTIONAL: No fever or fatigue  RESPIRATORY: No cough, wheezing, chills or hemoptysis; No shortness of breath  CARDIOVASCULAR: No chest pain, palpitations, dizziness, or leg swelling  GASTROINTESTINAL: No abdominal or epigastric pain. No nausea, vomiting; No diarrhea or constipation.   GENITOURINARY: No dysuria, frequency, hematuria, retention or incontinence  MUSCULOSKELETAL: No joint pain or swelling; No muscle, back, or extremity pain, no upper or lower motor strength weakness, no saddle anesthesia, bowel/bladder incontinence, no falls   NEURO: No headaches, No numbness/tingling b/l LE, No weakness  PSYCHIATRIC: No depression, anxiety, mood swings, or difficulty sleeping    PHYSICAL EXAM:  GENERAL:  Alert & Oriented X3, NAD, Good concentration  CHEST/LUNG: Clear to auscultation bilaterally; No rales, rhonchi, wheezing, or rubs  HEART: Regular rate and rhythm; No murmurs, rubs, or gallops  ABDOMEN: Soft, Nontender, Nondistended; Bowel sounds present  EXTREMITIES:  2+ Peripheral Pulses, No cyanosis, or edema  MUSCULOSKELETAL: + decreased rom Motor Strength 5/5 B/L upper and lower extremities; moves all extremities equally against gravity; ROM intact; negative SLR  SKIN: No rashes or lesions    Risk factors associated with adverse outcomes related to opioid treatment  [ ]  Concurrent benzodiazepine use  [ ]  History/ Active substance use or alcohol use disorder  [ ] Psychiatric co-morbidity  [ ] Sleep apnea  [ ] COPD  [ ] BMI> 35  [ ] Liver dysfunction  [ ] Renal dysfunction  [ ] CHF  [ ] Smoker  [ ]  Age > 60 years    [ ]  [place]  Reviewed and Copied to Chart. See below.    Plan of care and goal oriented pain management treatment options were discussed with patient and /or primary care giver; all questions and concerns were addressed and care was aligned with patient's wishes.    Educated patient on goal oriented pain management treatment options     01-31-22 @ 13:49 Source of information: , Chart review  Patient language: Fernando   :     CC:  left hip pain     This is a 89yFemale patient who presents to the hospital for Patient is a 89y old  Female who presents with a chief complaint of fracture (31 Jan 2022 11:15)    Pt s/p fall.  + left hip pain.  + left hip fracture.  Pt had initially refused surgery but after understand the benefits and risks of immobility. Pt has agreed for surgery. OR today.      PAIN SCORE:     4/10    SCALE USED: (1-10 NRS)      PAST MEDICAL & SURGICAL HISTORY:  HTN (hypertension)    Hypothyroid    COPD (chronic obstructive pulmonary disease)    Heart failure        FAMILY HISTORY:        SOCIAL HISTORY:  [x ] Denies Smoking, Alcohol, or Drug Use    Allergies    No Known Allergies    Intolerances        MEDICATIONS:    MEDICATIONS  (STANDING):  acetaminophen     Tablet .. 1000 milliGRAM(s) Oral every 8 hours  amLODIPine   Tablet 5 milliGRAM(s) Oral daily  budesonide  80 MICROgram(s)/formoterol 4.5 MICROgram(s) Inhaler 2 Puff(s) Inhalation two times a day  ferrous    sulfate Liquid 300 milliGRAM(s) Oral every 12 hours  furosemide    Tablet 40 milliGRAM(s) Oral two times a day  insulin lispro (ADMELOG) corrective regimen sliding scale   SubCutaneous every 6 hours  levothyroxine 25 MICROGram(s) Oral daily  metoprolol tartrate 25 milliGRAM(s) Oral two times a day  mirtazapine 7.5 milliGRAM(s) Oral at bedtime  senna 2 Tablet(s) Oral at bedtime    MEDICATIONS  (PRN):  oxyCODONE    IR 2.5 milliGRAM(s) Oral every 4 hours PRN Moderate Pain (4 - 6)  oxyCODONE    IR 5 milliGRAM(s) Oral every 4 hours PRN Severe Pain (7 - 10)      Vital Signs Last 24 Hrs  T(C): 37.1 (30 Jan 2022 20:35), Max: 37.1 (30 Jan 2022 20:35)  T(F): 98.8 (30 Jan 2022 20:35), Max: 98.8 (30 Jan 2022 20:35)  HR: 66 (30 Jan 2022 20:35) (66 - 66)  BP: 130/66 (30 Jan 2022 20:35) (130/66 - 130/66)  BP(mean): --  RR: --  SpO2: --    LABS:                          9.3    6.94  )-----------( 209      ( 31 Jan 2022 07:22 )             29.0     01-31    142  |  107  |  37<H>  ----------------------------<  93  3.6   |  28  |  1.42<H>    Ca    8.0<L>      31 Jan 2022 07:22  Phos  3.1     01-31  Mg     2.4     01-31    TPro  6.4  /  Alb  2.2<L>  /  TBili  1.2  /  DBili  x   /  AST  20  /  ALT  21  /  AlkPhos  92  01-31    PT/INR - ( 31 Jan 2022 07:22 )   PT: 13.2 sec;   INR: 1.12 ratio         PTT - ( 31 Jan 2022 07:22 )  PTT:36.6 sec  LIVER FUNCTIONS - ( 31 Jan 2022 07:22 )  Alb: 2.2 g/dL / Pro: 6.4 g/dL / ALK PHOS: 92 U/L / ALT: 21 U/L DA / AST: 20 U/L / GGT: x             CAPILLARY BLOOD GLUCOSE      POCT Blood Glucose.: 112 mg/dL (31 Jan 2022 11:31)  POCT Blood Glucose.: 106 mg/dL (31 Jan 2022 06:05)  POCT Blood Glucose.: 91 mg/dL (30 Jan 2022 23:29)      Radiology:    Drug Screen:        REVIEW OF SYSTEMS:  CONSTITUTIONAL: No fever or fatigue  RESPIRATORY: No cough, wheezing, chills or hemoptysis; No shortness of breath  CARDIOVASCULAR: No chest pain, palpitations, dizziness, or leg swelling  GASTROINTESTINAL: No abdominal or epigastric pain. No nausea, vomiting; No diarrhea or constipation.   GENITOURINARY: + grimaldo  MUSCULOSKELETAL: + left hip pain   NEURO: No headaches, No numbness/tingling b/l LE, No weakness  PSYCHIATRIC: No depression, anxiety, mood swings, or difficulty sleeping    PHYSICAL EXAM:  GENERAL:  Alert & Oriented X3, NAD, Good concentration  CHEST/LUNG: Clear to auscultation bilaterally; No rales, rhonchi, wheezing, or rubs  HEART: Regular rate and rhythm; No murmurs, rubs, or gallops  ABDOMEN: Soft, Nontender, Nondistended; Bowel sounds present  EXTREMITIES:  2+ Peripheral Pulses, No cyanosis, or edema  MUSCULOSKELETAL: + decreased rom + left hip tenderness   SKIN: No rashes or lesions    Risk factors associated with adverse outcomes related to opioid treatment  [ ]  Concurrent benzodiazepine use  [ ]  History/ Active substance use or alcohol use disorder  [ ] Psychiatric co-morbidity  [ ] Sleep apnea  [ ] COPD  [ ] BMI> 35  [ ] Liver dysfunction  [ ] Renal dysfunction  [ ] CHF  [ ] Smoker  [x]  Age > 60 years    [ x]  [place]  Reviewed and Copied to Chart. See below.    Plan of care and goal oriented pain management treatment options were discussed with patient and /or primary care giver; all questions and concerns were addressed and care was aligned with patient's wishes.    Educated patient on goal oriented pain management treatment options     01-31-22 @ 13:49

## 2022-01-31 NOTE — PROGRESS NOTE ADULT - SUBJECTIVE AND OBJECTIVE BOX
NP Note discussed with  primary attending    Patient is a 89y old  Female who presents with a chief complaint of fracture (31 Jan 2022 13:49)      INTERVAL HPI/OVERNIGHT EVENTS:  Pt talking but incomplete sentences.  Cali -Aruna # [id number].    MEDICATIONS  (STANDING):  acetaminophen     Tablet .. 1000 milliGRAM(s) Oral every 8 hours  amLODIPine   Tablet 5 milliGRAM(s) Oral daily  budesonide  80 MICROgram(s)/formoterol 4.5 MICROgram(s) Inhaler 2 Puff(s) Inhalation two times a day  ferrous    sulfate Liquid 300 milliGRAM(s) Oral every 12 hours  furosemide    Tablet 40 milliGRAM(s) Oral two times a day  insulin lispro (ADMELOG) corrective regimen sliding scale   SubCutaneous every 6 hours  levothyroxine 25 MICROGram(s) Oral daily  metoprolol tartrate 25 milliGRAM(s) Oral two times a day  mirtazapine 7.5 milliGRAM(s) Oral at bedtime  senna 2 Tablet(s) Oral at bedtime    MEDICATIONS  (PRN):  oxyCODONE    IR 2.5 milliGRAM(s) Oral every 4 hours PRN Moderate Pain (4 - 6)  oxyCODONE    IR 5 milliGRAM(s) Oral every 4 hours PRN Severe Pain (7 - 10)      __________________________________________________  REVIEW OF SYSTEMS:  Limited in confused pt     CONSTITUTIONAL: No fever  EYES: No acute visual disturbances  NECK: No pain or stiffness  RESPIRATORY: No cough; No shortness of breath  CARDIOVASCULAR: No chest pain, no palpitations  GASTROINTESTINAL: No pain. No nausea or vomiting.  No diarrhea   NEUROLOGICAL: No headache or numbness, no tremors  MUSCULOSKELETAL: No joint pain, no muscle pain  GENITOURINARY: No dysuria, no frequency, no hesitancy  PSYCHIATRY: No depression , no anxiety  ALL OTHER  ROS negative        Vital Signs Last 24 Hrs  T(C): 36.5 (31 Jan 2022 15:18), Max: 37.1 (30 Jan 2022 20:35)  T(F): 97.7 (31 Jan 2022 15:18), Max: 98.8 (30 Jan 2022 20:35)  HR: 63 (31 Jan 2022 15:18) (63 - 66)  BP: 136/57 (31 Jan 2022 15:18) (130/66 - 136/57)  RR: 18 (31 Jan 2022 15:18) (18 - 18)  SpO2: 100% (31 Jan 2022 15:18) (100% - 100%)    ________________________________________________  PHYSICAL EXAM:  GENERAL: NAD  HEENT: Normocephalic;  conjunctivae and sclerae clear; moist mucous membranes;   NECK : Supple  CHEST/LUNG: Mile rales throughout to auscultation   HEART: S1 S2  regular; no murmurs, gallops or rubs  ABDOMEN: Soft, Nontender, Nondistended; Bowel sounds present x 4 quad  EXTREMITIES: No cyanosis; no edema; no calf tenderness.  (+) LLE decreased ROM.    SKIN: Warm and dry; no rash  NERVOUS SYSTEM:  Awake, alert to self; not oriented  to place and time ; no new deficits    _________________________________________________  LABS:                        9.3    6.94  )-----------( 209      ( 31 Jan 2022 07:22 )             29.0     01-31    142  |  107  |  37<H>  ----------------------------<  93  3.6   |  28  |  1.42<H>    Ca    8.0<L>      31 Jan 2022 07:22  Phos  3.1     01-31  Mg     2.4     01-31    TPro  6.4  /  Alb  2.2<L>  /  TBili  1.2  /  DBili  x   /  AST  20  /  ALT  21  /  AlkPhos  92  01-31    PT/INR - ( 31 Jan 2022 07:22 )   PT: 13.2 sec;   INR: 1.12 ratio         PTT - ( 31 Jan 2022 07:22 )  PTT:36.6 sec    CAPILLARY BLOOD GLUCOSE      POCT Blood Glucose.: 112 mg/dL (31 Jan 2022 11:31)  POCT Blood Glucose.: 106 mg/dL (31 Jan 2022 06:05)  POCT Blood Glucose.: 91 mg/dL (30 Jan 2022 23:29)        RADIOLOGY & ADDITIONAL TESTS:    Imaging Personally Reviewed:  YES    Consultant(s) Notes Reviewed:   YES    Care Discussed with Consultants :     Plan of care was discussed with patient and /or primary care giver; all questions and concerns were addressed and care was aligned with patient's wishes.

## 2022-01-31 NOTE — CHART NOTE - NSCHARTNOTEFT_GEN_A_CORE
EVENT: Paged by RN, pt with urinary retention. Bladder scan revealed approximately 460 ml of urine.     HPI: 89 yo female PMH COPD, CHF, HTN, hypothyroid coming in s/p fall with left hip pain. x-ray  Impacted left basicervical fracture s/p Left hip hemiarthroplasty POD 0.     SUBJECTIVE: Pt seen and examined at bedside, awake and alert to self    OBJECTIVE:  Vital Signs Last 24 Hrs  T(C): 36.4 (31 Jan 2022 21:45), Max: 37 (31 Jan 2022 17:40)  T(F): 97.5 (31 Jan 2022 21:45), Max: 98.6 (31 Jan 2022 17:40)  HR: 66 (31 Jan 2022 21:45) (61 - 70)  BP: 130/52 (31 Jan 2022 21:45) (130/52 - 154/59)  BP(mean): 115 (31 Jan 2022 18:25) (85 - 115)  RR: 18 (31 Jan 2022 21:45) (12 - 22)  SpO2: 100% (31 Jan 2022 21:45) (96% - 100%)    PHYSICAL EXAM:  Nervous system:  Awake, alert to self; not oriented  to place and time ; no new deficits  Cardiovascular: + S1, S2, no murmurs, rubs, or bruits  Respiratory: clear to auscultation bilaterally with good air entry   GI: Abdomen soft, non-tender, bowel sounds present   : distended   Skin: warm and dry; no rash      LABS:                        9.3    6.94  )-----------( 209      ( 31 Jan 2022 07:22 )             29.0     01-31    142  |  107  |  37<H>  ----------------------------<  93  3.6   |  28  |  1.42<H>    Ca    8.0<L>      31 Jan 2022 07:22  Phos  3.1     01-31  Mg     2.4     01-31    TPro  6.4  /  Alb  2.2<L>  /  TBili  1.2  /  DBili  x   /  AST  20  /  ALT  21  /  AlkPhos  92  01-31      EKG:   IMAGING:    ASSESSMENT: Post op urinary retention possibly due to effect of anesthesia     PLAN:     -Martinez catheter, ordered  -Continue current/supportive care  -TOV when clinically appropriate     FOLLOW UP / RESULT:     -Monitor effectiveness of above intervention EVENT: Paged by RN, pt with urinary retention. Bladder scan revealed approximately 460 ml of urine.     HPI: 89 yo female PMH COPD, CHF, HTN, hypothyroid coming in s/p fall with left hip pain. x-ray  Impacted left basicervical fracture s/p Left hip hemiarthroplasty POD 0.     SUBJECTIVE: Pt seen and examined at bedside, awake and alert     OBJECTIVE:  Vital Signs Last 24 Hrs  T(C): 36.4 (31 Jan 2022 21:45), Max: 37 (31 Jan 2022 17:40)  T(F): 97.5 (31 Jan 2022 21:45), Max: 98.6 (31 Jan 2022 17:40)  HR: 66 (31 Jan 2022 21:45) (61 - 70)  BP: 130/52 (31 Jan 2022 21:45) (130/52 - 154/59)  BP(mean): 115 (31 Jan 2022 18:25) (85 - 115)  RR: 18 (31 Jan 2022 21:45) (12 - 22)  SpO2: 100% (31 Jan 2022 21:45) (96% - 100%)    PHYSICAL EXAM:  Nervous system:  Awake, alert to self; not oriented time ; no new deficits  Cardiovascular: + S1, S2, no murmurs, rubs, or bruits  Respiratory: clear to auscultation bilaterally with good air entry   GI: Abdomen soft, non-tender, bowel sounds present   : distended   Skin: warm and dry; no rash      LABS:                        9.3    6.94  )-----------( 209      ( 31 Jan 2022 07:22 )             29.0     01-31    142  |  107  |  37<H>  ----------------------------<  93  3.6   |  28  |  1.42<H>    Ca    8.0<L>      31 Jan 2022 07:22  Phos  3.1     01-31  Mg     2.4     01-31    TPro  6.4  /  Alb  2.2<L>  /  TBili  1.2  /  DBili  x   /  AST  20  /  ALT  21  /  AlkPhos  92  01-31      EKG:   IMAGING:    ASSESSMENT: Post op urinary retention possibly due to effect of anesthesia     PLAN:     -Martinez catheter, ordered  -Continue current/supportive care  -TOV when clinically appropriate     FOLLOW UP / RESULT:     -Monitor effectiveness of above intervention

## 2022-01-31 NOTE — PROGRESS NOTE ADULT - PROBLEM SELECTOR PLAN 1
Pt with left hip pain s/p fall.  + left hip fracture.  Pt has agreed for surgery. Pt is optimized after receiving blood.  OR today.   High risk medications reviewed. Avoid polypharmacy. Avoid IV opioids. Avoid NSAIDs and benzodiazepines. Non-pharmacological sleep aides initiated. Non-opioid medications and non-pharmacological pain management measures initiated.  Mild pain - pain level 1-3  nonpharmacological measures  ice packs, heat packs, PT/OOB, guided imagery, distraction   Nonopioid recc  - Acetaminophen 1 gram po q 8 hours atc for 3 days   - Lidocaine 4% patch daily   Opioid Recc  - oxycodone 2.5mg po q 4 hours prn moderate pain      - oxycodone 5mg po q 4 hours prn severe pain   Bowel Regimen  - senna   OOB/Pt  + anemia - continue ferrous sulfate.

## 2022-02-01 LAB
ALBUMIN SERPL ELPH-MCNC: 1.7 G/DL — LOW (ref 3.5–5)
ALP SERPL-CCNC: 68 U/L — SIGNIFICANT CHANGE UP (ref 40–120)
ALT FLD-CCNC: 18 U/L DA — SIGNIFICANT CHANGE UP (ref 10–60)
ANION GAP SERPL CALC-SCNC: 7 MMOL/L — SIGNIFICANT CHANGE UP (ref 5–17)
AST SERPL-CCNC: 19 U/L — SIGNIFICANT CHANGE UP (ref 10–40)
BILIRUB SERPL-MCNC: 0.7 MG/DL — SIGNIFICANT CHANGE UP (ref 0.2–1.2)
BUN SERPL-MCNC: 39 MG/DL — HIGH (ref 7–18)
CALCIUM SERPL-MCNC: 7.8 MG/DL — LOW (ref 8.4–10.5)
CHLORIDE SERPL-SCNC: 110 MMOL/L — HIGH (ref 96–108)
CO2 SERPL-SCNC: 29 MMOL/L — SIGNIFICANT CHANGE UP (ref 22–31)
CREAT SERPL-MCNC: 1.54 MG/DL — HIGH (ref 0.5–1.3)
GLUCOSE BLDC GLUCOMTR-MCNC: 74 MG/DL — SIGNIFICANT CHANGE UP (ref 70–99)
GLUCOSE BLDC GLUCOMTR-MCNC: 84 MG/DL — SIGNIFICANT CHANGE UP (ref 70–99)
GLUCOSE BLDC GLUCOMTR-MCNC: 86 MG/DL — SIGNIFICANT CHANGE UP (ref 70–99)
GLUCOSE SERPL-MCNC: 94 MG/DL — SIGNIFICANT CHANGE UP (ref 70–99)
HCT VFR BLD CALC: 24.8 % — LOW (ref 34.5–45)
HGB BLD-MCNC: 7.5 G/DL — LOW (ref 11.5–15.5)
MAGNESIUM SERPL-MCNC: 2.3 MG/DL — SIGNIFICANT CHANGE UP (ref 1.6–2.6)
MCHC RBC-ENTMCNC: 22.1 PG — LOW (ref 27–34)
MCHC RBC-ENTMCNC: 30.2 GM/DL — LOW (ref 32–36)
MCV RBC AUTO: 73.2 FL — LOW (ref 80–100)
NRBC # BLD: 0 /100 WBCS — SIGNIFICANT CHANGE UP (ref 0–0)
PHOSPHATE SERPL-MCNC: 3.8 MG/DL — SIGNIFICANT CHANGE UP (ref 2.5–4.5)
PLATELET # BLD AUTO: 186 K/UL — SIGNIFICANT CHANGE UP (ref 150–400)
POTASSIUM SERPL-MCNC: 4 MMOL/L — SIGNIFICANT CHANGE UP (ref 3.5–5.3)
POTASSIUM SERPL-SCNC: 4 MMOL/L — SIGNIFICANT CHANGE UP (ref 3.5–5.3)
PROT SERPL-MCNC: 5.3 G/DL — LOW (ref 6–8.3)
RBC # BLD: 3.39 M/UL — LOW (ref 3.8–5.2)
RBC # FLD: 23.2 % — HIGH (ref 10.3–14.5)
SODIUM SERPL-SCNC: 146 MMOL/L — HIGH (ref 135–145)
WBC # BLD: 6.91 K/UL — SIGNIFICANT CHANGE UP (ref 3.8–10.5)
WBC # FLD AUTO: 6.91 K/UL — SIGNIFICANT CHANGE UP (ref 3.8–10.5)

## 2022-02-01 PROCEDURE — 99231 SBSQ HOSP IP/OBS SF/LOW 25: CPT

## 2022-02-01 RX ORDER — FERROUS SULFATE 325(65) MG
300 TABLET ORAL EVERY 12 HOURS
Refills: 0 | Status: DISCONTINUED | OUTPATIENT
Start: 2022-02-01 | End: 2022-02-02

## 2022-02-01 RX ADMIN — Medication 300 MILLIGRAM(S): at 17:48

## 2022-02-01 RX ADMIN — Medication 1000 MILLIGRAM(S): at 18:30

## 2022-02-01 RX ADMIN — OXYCODONE HYDROCHLORIDE 2.5 MILLIGRAM(S): 5 TABLET ORAL at 12:00

## 2022-02-01 RX ADMIN — Medication 1000 MILLIGRAM(S): at 11:18

## 2022-02-01 RX ADMIN — BUDESONIDE AND FORMOTEROL FUMARATE DIHYDRATE 2 PUFF(S): 160; 4.5 AEROSOL RESPIRATORY (INHALATION) at 21:35

## 2022-02-01 RX ADMIN — Medication 100 MILLIGRAM(S): at 00:32

## 2022-02-01 RX ADMIN — Medication 100 MILLIGRAM(S): at 11:15

## 2022-02-01 RX ADMIN — Medication 1000 MILLIGRAM(S): at 12:00

## 2022-02-01 RX ADMIN — Medication 1000 MILLIGRAM(S): at 17:48

## 2022-02-01 RX ADMIN — POLYETHYLENE GLYCOL 3350 17 GRAM(S): 17 POWDER, FOR SOLUTION ORAL at 11:15

## 2022-02-01 RX ADMIN — Medication 40 MILLIGRAM(S): at 12:20

## 2022-02-01 RX ADMIN — BUDESONIDE AND FORMOTEROL FUMARATE DIHYDRATE 2 PUFF(S): 160; 4.5 AEROSOL RESPIRATORY (INHALATION) at 11:15

## 2022-02-01 RX ADMIN — OXYCODONE HYDROCHLORIDE 2.5 MILLIGRAM(S): 5 TABLET ORAL at 11:16

## 2022-02-01 RX ADMIN — HEPARIN SODIUM 5000 UNIT(S): 5000 INJECTION INTRAVENOUS; SUBCUTANEOUS at 05:09

## 2022-02-01 RX ADMIN — HEPARIN SODIUM 5000 UNIT(S): 5000 INJECTION INTRAVENOUS; SUBCUTANEOUS at 15:28

## 2022-02-01 RX ADMIN — HEPARIN SODIUM 5000 UNIT(S): 5000 INJECTION INTRAVENOUS; SUBCUTANEOUS at 21:35

## 2022-02-01 NOTE — DISCHARGE NOTE PROVIDER - CARE PROVIDERS DIRECT ADDRESSES
[email or web address]

## 2022-02-01 NOTE — DISCHARGE NOTE PROVIDER - NSDCCPTREATMENT_GEN_ALL_CORE_FT
PRINCIPAL PROCEDURE  Procedure: Hemiarthroplasty of left hip  Findings and Treatment: Pain Management- See Attached Medication Reconciliation  **Posterior Hip Precautions for Total hip replacement***  Weight Bearing Status: WBAT to LLE  Dressing: Please keep bandage/dressing Clean, Dry, and Intact.  Keep incision clean/dry and intact  Dry dressing changes  Dvt prophylaxis: Heparin  PT/Occupational Therapy are Activities of Daily Living as appropriate  Martinez management as per Dr. Hammer  Follow-up with Dr. Wright in ONE WEEK at [phone]

## 2022-02-01 NOTE — PHYSICAL THERAPY INITIAL EVALUATION ADULT - GENERAL OBSERVATIONS, REHAB EVAL
Consult received, chart reviewed. Patient received supine in bed, NAD, +Rt Juan SHOOK NC. Patient agreed to EVALUATION from Physical Therapist.

## 2022-02-01 NOTE — PHYSICAL THERAPY INITIAL EVALUATION ADULT - ACTIVE RANGE OF MOTION EXAMINATION, REHAB EVAL
BUE WFL except shoulders ~1/3 range. Rt hips few deg active motion gravity eliminated, Rt knee ~2/3 range against gravity Lt hip no active motion but contraction, Lt knee few deg motion. Ankels at least 2/3 motion.

## 2022-02-01 NOTE — DISCHARGE NOTE PROVIDER - HOSPITAL COURSE
63yo female with PMHx of DM, HBP, arthritis.  Presented to the ED with progressive pain in R foot, chills but no fever.   Admitted with Severe Sepsis 2/2 to Right Foot Gangrene.   Received Ciprofloxacin and Clindamycin.  Ciprofloxacin and Clindamycin were discontinued.  Instead  Vancomycin a broad-spectrum antibiotic was started for treatment. 1/28 underwent amputation of 1st 2 toes on the right.  Blood cultures from admission grew gram positive in clusters and identified as Peptoniphilus harei.  Surgical swab of right foot 1/28 grew Escherichia coli, Enterococcus faecalis, E. avium and Bacteroides thetaiotaomicron.  Surgical swab of right foot 1/29 grew E. coli, B. thetaiotaomicron, and Peptoniphilus harei.   1/29 tissue culture grew E. coli, B. thetaiotaomicron and Petoniphilus harei.  All organisms were susceptible to Vancomycin.  On 1/31 post op appearance of right foot surgical site was concerning for continued necrosis of the right foot.  Therefore patient was scheduled for R TMA planned for 2/2.         a/o 2/1    THIS IS A BRIEF HISTORY OF EVENTS.  FOR A FULL HISTORY PLEASE SEE THE CHART     89 year old female, from [facility], w/ [facility] of COPD, CHF, HTN, hypothyroid.  Presented to the ED after a fall and with left hip pain. Admitted for Left Hip Facture.  1/29 went to the OR for Left Hip Arthroplasty.  Hospital course complicated by chronic anemia warranting 2 blood transfusions.      a/o 2/1      THIS IS A BRIEF HISTORY OF EVENTS.  FOR A FULL HISTORY PLEASE SEE THE CHART

## 2022-02-01 NOTE — PHYSICAL THERAPY INITIAL EVALUATION ADULT - PLANNED THERAPY INTERVENTIONS, PT EVAL
balance training/bed mobility training/neuromuscular re-education/postural re-education/ROM/strengthening/stretching/transfer training/wheelchair management/propulsion training

## 2022-02-01 NOTE — PROGRESS NOTE ADULT - PROBLEM SELECTOR PROBLEM 6
DM (diabetes mellitus)
CHF (congestive heart failure)
CHF (congestive heart failure)
DM (diabetes mellitus)

## 2022-02-01 NOTE — PROGRESS NOTE ADULT - PROBLEM SELECTOR PROBLEM 5
Hypothyroidism
Pressure ulcer of coccygeal region, stage 1
Hypothyroidism
Pressure ulcer of coccygeal region, stage 1

## 2022-02-01 NOTE — PROGRESS NOTE ADULT - PROBLEM SELECTOR PLAN 1
Presented after a fall   - S/p hip xray: left hip fracture  - 1/31 OR for Left hip hemiarthroplasty   - Ortho-Dr. Wright consulted, appreciated   - Pain mgmt consulted, appreciated

## 2022-02-01 NOTE — PHYSICAL THERAPY INITIAL EVALUATION ADULT - FUNCTIONAL LIMITATIONS, PT EVAL
I called and left a message for the patient to call me back to discuss her cataract evaluation with Dr Childs on 3-29-19. She wears Rigid gas permeable lenses with her glasses. She is very myopic. I wanted to discuss with her the IOP master measurements with regards to her contact lenses. She can have the consultation and see what her and Dr Childs decide. If she decides to go ahead with cataract surgery we can have her return to the office for the measurements.   self-care/home management/community/leisure

## 2022-02-01 NOTE — DISCHARGE NOTE PROVIDER - HOSPITAL COURSE
89 year old female, from [facility], w/ [facility] of COPD, CHF, HTN, hypothyroid.  Presented to the ED after a fall and with left hip pain. Admitted for Left Hip Facture.  1/29 went to the OR for Left Hip Arthroplasty.  Hospital course complicated by chronic anemia warranting 2 blood transfusions.      a/o 2/1      THIS IS A BRIEF HISTORY OF EVENTS.  FOR A FULL HISTORY PLEASE SEE THE CHART   89 year old female, from [facility], w/ PMH of COPD, CHF, HTN, hypothyroid.  Presented to the ED after a fall and with left hip pain. Admitted for Left Hip Facture.  1/29 went to the OR for Left Hip Arthroplasty.  Hospital course complicated by chronic anemia warranting 2 blood transfusions.      a/o 2/1    Pt will return to [facility] w/ grimaldo d/t urinary retention.          THIS IS A BRIEF HISTORY OF EVENTS.  FOR A FULL HISTORY PLEASE SEE THE CHART   89 year old female, from [facility], w/ PMH of COPD, CHF, HTN, hypothyroid.  Presented to the ED after a fall and with left hip pain. Admitted for Left Hip Facture.  1/29 went to the OR for Left Hip Arthroplasty.  Hospital course complicated by chronic anemia warranting 3 blood transfusions.      a/o 2/1    Pt will return to [facility] w/ grimaldo d/t urinary retention.          THIS IS A BRIEF HISTORY OF EVENTS.  FOR A FULL HISTORY PLEASE SEE THE CHART   89 year old female, from [facility], / [facility] of COPD, CHF, HTN, hypothyroid.  Presented to the ED after a fall and with left hip pain. Admitted for Left Hip Facture.  1/31 went to the OR for Left Hip Arthroplasty.  Hospital course complicated by chronic anemia warranting 3 blood transfusions.            THIS IS A BRIEF HISTORY OF EVENTS.  FOR A FULL HISTORY PLEASE SEE THE CHART

## 2022-02-01 NOTE — DISCHARGE NOTE PROVIDER - NSDCMRMEDTOKEN_GEN_ALL_CORE_FT
amLODIPine 5 mg oral tablet: 1 tab(s) orally once a day  ferrous sulfate 220 mg/5 mL (44 mg/5 mL elemental iron) oral elixir: 5 milliliter(s) orally 2 times a day  furosemide 40 mg oral tablet: 1 tab(s) orally 2 times a day  levothyroxine 25 mcg (0.025 mg) oral tablet: 1 tab(s) orally once a day  Metoprolol Tartrate 25 mg oral tablet: 1 tab(s) orally 2 times a day  Remeron 15 mg oral tablet: 0.5 tab(s) orally once a day (at bedtime)  Senna 8.6 mg oral tablet: 2 tab(s) orally once a day (at bedtime)  Symbicort 80 mcg-4.5 mcg/inh inhalation aerosol: 2 puff(s) inhaled 2 times a day  Ultram 50 mg oral tablet: 1 tab(s) orally 3 times a day   acetaminophen 500 mg oral tablet: 2 tab(s) orally every 6 hours  amLODIPine 5 mg oral tablet: 1 tab(s) orally once a day  ferrous sulfate 220 mg/5 mL (44 mg/5 mL elemental iron) oral elixir: 5 milliliter(s) orally 2 times a day  furosemide 40 mg oral tablet: 1 tab(s) orally 2 times a day  levothyroxine 25 mcg (0.025 mg) oral tablet: 1 tab(s) orally once a day  magnesium hydroxide 8% oral suspension: 30 milliliter(s) orally once a day, As needed, Constipation  Metoprolol Tartrate 25 mg oral tablet: 1 tab(s) orally 2 times a day  oxyCODONE: 2.5 milligram(s) orally every 4 hours, As Needed for Severe Pain (7-10)  polyethylene glycol 3350 oral powder for reconstitution: 17 gram(s) orally once a day  Remeron 15 mg oral tablet: 0.5 tab(s) orally once a day (at bedtime)  Senna 8.6 mg oral tablet: 2 tab(s) orally once a day (at bedtime)  Symbicort 80 mcg-4.5 mcg/inh inhalation aerosol: 2 puff(s) inhaled 2 times a day   acetaminophen 500 mg oral tablet: 2 tab(s) orally every 6 hours  amLODIPine 5 mg oral tablet: 1 tab(s) orally once a day  ferrous sulfate 220 mg/5 mL (44 mg/5 mL elemental iron) oral elixir: 5 milliliter(s) orally 2 times a day  furosemide 40 mg oral tablet: 1 tab(s) orally 2 times a day  heparin: 5000 unit(s) subcutaneous every 8 hours  for post op ppx  levothyroxine 25 mcg (0.025 mg) oral tablet: 1 tab(s) orally once a day  magnesium hydroxide 8% oral suspension: 30 milliliter(s) orally once a day, As needed, Constipation  Metoprolol Tartrate 25 mg oral tablet: 1 tab(s) orally 2 times a day  oxyCODONE: 2.5 milligram(s) orally every 4 hours, As Needed for Severe Pain (7-10)  polyethylene glycol 3350 oral powder for reconstitution: 17 gram(s) orally once a day  Remeron 15 mg oral tablet: 0.5 tab(s) orally once a day (at bedtime)  Senna 8.6 mg oral tablet: 2 tab(s) orally once a day (at bedtime)  Symbicort 80 mcg-4.5 mcg/inh inhalation aerosol: 2 puff(s) inhaled 2 times a day

## 2022-02-01 NOTE — PROGRESS NOTE ADULT - PROBLEM SELECTOR PLAN 4
Chronic anemia  - Hgb=7.5 today   - C/w Ferrous sulfate   - S/p 2U PRBC's  - Received 3rd PRBC today, 2/1  - Continue to monitor CBC in AM-f/u results
Chronic anemia  - Hgb stable   - C/w Ferrous sulfate   - S/p 2U PRBC's  - Continue to monitor CBC
Will start on ISS  FS Q6
Will start on ISS  FS Q6

## 2022-02-01 NOTE — PROGRESS NOTE ADULT - SUBJECTIVE AND OBJECTIVE BOX
Source of information: , Chart review  Patient language: Fernando   : daughter as per pt wishes     CC:  left hip pain    This is a 89yFemale patient who presents to the hospital for Patient is a 89y old  Female who presents with a chief complaint of fracture (01 Feb 2022 10:40)    Pt s/p fall.  + left hip fracture.  Pt  left hip hemiarthroplasty pod#1.  + left hip pain.  Patient is oob to chair with PT.  + poor appetite.  No nausea or vomiting.  Nutrition consult done.  Pt had been refusing meds.  Pt was reeducated and agreed to taking pain meds.  Daughter by bedside interpreting.       PAIN SCORE:   4/10      SCALE USED: (1-10 NRS)      PAST MEDICAL & SURGICAL HISTORY:  HTN (hypertension)    Hypothyroid    COPD (chronic obstructive pulmonary disease)    Heart failure        FAMILY HISTORY:        SOCIAL HISTORY:  x[ ] Denies Smoking, Alcohol, or Drug Use    Allergies    No Known Allergies    Intolerances        MEDICATIONS:    MEDICATIONS  (STANDING):  acetaminophen     Tablet .. 1000 milliGRAM(s) Oral every 6 hours  amLODIPine   Tablet 5 milliGRAM(s) Oral daily  budesonide  80 MICROgram(s)/formoterol 4.5 MICROgram(s) Inhaler 2 Puff(s) Inhalation two times a day  furosemide    Tablet 40 milliGRAM(s) Oral two times a day  heparin   Injectable 5000 Unit(s) SubCutaneous every 8 hours  levothyroxine 25 MICROGram(s) Oral daily  metoprolol tartrate 25 milliGRAM(s) Oral two times a day  mirtazapine 7.5 milliGRAM(s) Oral at bedtime  polyethylene glycol 3350 17 Gram(s) Oral daily  senna 2 Tablet(s) Oral at bedtime    MEDICATIONS  (PRN):  magnesium hydroxide Suspension 30 milliLiter(s) Oral daily PRN Constipation  oxyCODONE    IR 2.5 milliGRAM(s) Oral every 4 hours PRN Severe Pain (7 - 10)      Vital Signs Last 24 Hrs  T(C): 36.7 (01 Feb 2022 05:08), Max: 37 (31 Jan 2022 17:40)  T(F): 98 (01 Feb 2022 05:08), Max: 98.6 (31 Jan 2022 17:40)  HR: 86 (01 Feb 2022 09:14) (61 - 86)  BP: 111/48 (01 Feb 2022 09:14) (108/51 - 154/59)  BP(mean): 68 (01 Feb 2022 05:08) (66 - 115)  RR: 17 (01 Feb 2022 05:08) (12 - 22)  SpO2: 98% (01 Feb 2022 09:14) (96% - 100%)    LABS:                          7.5    6.91  )-----------( 186      ( 01 Feb 2022 06:30 )             24.8     02-01    146<H>  |  110<H>  |  39<H>  ----------------------------<  94  4.0   |  29  |  1.54<H>    Ca    7.8<L>      01 Feb 2022 06:30  Phos  3.8     02-01  Mg     2.3     02-01    TPro  5.3<L>  /  Alb  1.7<L>  /  TBili  0.7  /  DBili  x   /  AST  19  /  ALT  18  /  AlkPhos  68  02-01    PT/INR - ( 31 Jan 2022 07:22 )   PT: 13.2 sec;   INR: 1.12 ratio         PTT - ( 31 Jan 2022 07:22 )  PTT:36.6 sec  LIVER FUNCTIONS - ( 01 Feb 2022 06:30 )  Alb: 1.7 g/dL / Pro: 5.3 g/dL / ALK PHOS: 68 U/L / ALT: 18 U/L DA / AST: 19 U/L / GGT: x             CAPILLARY BLOOD GLUCOSE      POCT Blood Glucose.: 84 mg/dL (01 Feb 2022 07:39)  POCT Blood Glucose.: 86 mg/dL (01 Feb 2022 06:12)  POCT Blood Glucose.: 74 mg/dL (01 Feb 2022 05:03)  POCT Blood Glucose.: 84 mg/dL (31 Jan 2022 23:04)      Radiology:    Drug Screen:        REVIEW OF SYSTEMS:  CONSTITUTIONAL: No fever or fatigue  RESPIRATORY: No cough, wheezing, chills or hemoptysis; No shortness of breath  CARDIOVASCULAR: No chest pain, palpitations, dizziness, or leg swelling  GASTROINTESTINAL: No abdominal or epigastric pain. No nausea, vomiting; No diarrhea or constipation.   GENITOURINARY: No dysuria, frequency, hematuria, retention or incontinence  MUSCULOSKELETAL: + left hip pain   NEURO: No headaches, No numbness/tingling b/l LE, No weakness  PSYCHIATRIC: No depression, anxiety, mood swings, or difficulty sleeping    PHYSICAL EXAM:  GENERAL:  Alert & Oriented X3, NAD, Good concentration  CHEST/LUNG: + wheezing   HEART: Regular rate and rhythm; No murmurs, rubs, or gallops  ABDOMEN: Soft, Nontender, Nondistended; Bowel sounds present  EXTREMITIES:  2+ Peripheral Pulses, No cyanosis, or edema  MUSCULOSKELETAL: + decreased rom + left hip tenderness   SKIN: No rashes or lesions    Risk factors associated with adverse outcomes related to opioid treatment  [ ]  Concurrent benzodiazepine use  [ ]  History/ Active substance use or alcohol use disorder  [ ] Psychiatric co-morbidity  [ ] Sleep apnea  [ ] COPD  [ ] BMI> 35  [ ] Liver dysfunction  [ ] Renal dysfunction  [ ] CHF  [ ] Smoker  [x ]  Age > 60 years    [x ]  [place]  Reviewed and Copied to Chart. See below.    Plan of care and goal oriented pain management treatment options were discussed with patient and /or primary care giver; all questions and concerns were addressed and care was aligned with patient's wishes.    Educated patient on goal oriented pain management treatment options     02-01-22 @ 11:37

## 2022-02-01 NOTE — PHYSICAL THERAPY INITIAL EVALUATION ADULT - DIAGNOSIS, PT EVAL
Impaired cognition, balance, strength, endurance and ROM. Pain leading to below noted functional limitations:

## 2022-02-01 NOTE — DISCHARGE NOTE PROVIDER - NSDCMRMEDTOKEN_GEN_ALL_CORE_FT
amLODIPine 5 mg oral tablet: 1 tab(s) orally once a day  ferrous sulfate 220 mg/5 mL (44 mg/5 mL elemental iron) oral elixir: 5 milliliter(s) orally 2 times a day  furosemide 40 mg oral tablet: 1 tab(s) orally 2 times a day  levothyroxine 25 mcg (0.025 mg) oral tablet: 1 tab(s) orally once a day  Metoprolol Tartrate 25 mg oral tablet: 1 tab(s) orally 2 times a day  Remeron 15 mg oral tablet: 0.5 tab(s) orally once a day (at bedtime)  Senna 8.6 mg oral tablet: 2 tab(s) orally once a day (at bedtime)  Symbicort 80 mcg-4.5 mcg/inh inhalation aerosol: 2 puff(s) inhaled 2 times a day  Ultram 50 mg oral tablet: 1 tab(s) orally 3 times a day

## 2022-02-01 NOTE — PHYSICAL THERAPY INITIAL EVALUATION ADULT - PASSIVE RANGE OF MOTION EXAMINATION, REHAB EVAL
except Lt hip not assessed beyond 90 deg flx, No IR or Hip Add. secondary to hip precautions/bilateral upper extremity Passive ROM was WFL (within functional limits)/bilateral lower extremity Passive ROM was WFL (within functional limits)

## 2022-02-01 NOTE — PROGRESS NOTE ADULT - PROBLEM SELECTOR PLAN 6
-A1C=5.2, controlled   - C/w HSS
Continue home medications
Continue home medications
-A1C=5.2, controlled   - C/w HSS

## 2022-02-01 NOTE — DISCHARGE NOTE PROVIDER - NSDCCPCAREPLAN_GEN_ALL_CORE_FT
PRINCIPAL DISCHARGE DIAGNOSIS  Diagnosis: Hip fracture, left  Assessment and Plan of Treatment:       SECONDARY DISCHARGE DIAGNOSES  Diagnosis: Anemia  Assessment and Plan of Treatment:     Diagnosis: Urinary retention  Assessment and Plan of Treatment:      PRINCIPAL DISCHARGE DIAGNOSIS  Diagnosis: Hip fracture, left  Assessment and Plan of Treatment:       SECONDARY DISCHARGE DIAGNOSES  Diagnosis: Anemia  Assessment and Plan of Treatment: On admission your hemoglobin was 6.4 and that was very low.  You received two blood transfusions and your hemoglobin improved to 9.1 which was still a sign of anemia.  After your procedure your hemoglobin was 7.2.  You received another blood transfusion and your hemoglobin improved to 8.2.  You've been monitored and there are no overt signs of bleeding.  Though you've received three blood transfusion you are still anemic.  Please monitor and immediately notify your PCP if you have bleeding, palpitations, fatigue, pale skin, cold skin, dizziness. Continue to take your iron supplment as prescribed.    Diagnosis: Urinary retention  Assessment and Plan of Treatment: Urinary retention is the accumulation of urine in the bladder that results from incomplete or inadequate bladder emptying.   You have urinary retention and a grimaldo catheter was placed to help empty your bladder.  Incomplete bladder emptying and retention of urine can lead to urinary tract infections.  Therefore you should continue using your grimaldo to help empty your bladder of urine.  Please follow up with your PCP and a Urologist to further evalaute your inability to empty your bladder.       Diagnosis: Pressure ulcer of coccygeal region, stage 1  Assessment and Plan of Treatment: You have a pressure injury on your coccyx.  Pressure injury comes from lying in bed or sitting in a wheelchair for too long.  While you were in the hospital you were seen by wound care and your coccyx was covered for protection.  You should continue to cover your coccyx with a foam dressing to protect and relieve some of the pressure. Covering your coccyx with a foam dressing helps to prevent further damage to your coccyx.  You are encouraged to change the foam dressing every three days or if it is soiled.     PRINCIPAL DISCHARGE DIAGNOSIS  Diagnosis: Hip fracture, left  Assessment and Plan of Treatment: Pain Management- See Attached Medication Reconciliation  **Posterior Hip Precautions for Total hip replacement***  Weight Bearing Status: WBAT to LLE  Dressing: Please keep bandage/dressing Clean, Dry, and Intact.  Keep incision clean/dry and intact  Dry dressing changes  Dvt prophylaxis: Heparin  PT/Occupational Therapy are Activities of Daily Living as appropriate  Grimaldo management as per Dr. Hammer  Follow-up with Dr. Wright in ONE WEEK at [phone]      SECONDARY DISCHARGE DIAGNOSES  Diagnosis: Urinary retention  Assessment and Plan of Treatment: Urinary retention is the accumulation of urine in the bladder that results from incomplete or inadequate bladder emptying.   You have urinary retention and a grimaldo catheter was placed to help empty your bladder.  Incomplete bladder emptying and retention of urine can lead to urinary tract infections.  Therefore you should continue using your grimaldo to help empty your bladder of urine.  Please follow up with Dr. Hammer and a Urologist to further evalaute your inability to empty your bladder.       Diagnosis: Anemia  Assessment and Plan of Treatment: On admission your hemoglobin was 6.4 and that was very low.  You received two blood transfusions and your hemoglobin improved to 9.1 which was still a sign of anemia.  After your procedure your hemoglobin was 7.2.  You received another blood transfusion and your hemoglobin improved to 8.2.  You've been monitored and there are no overt signs of bleeding.  Though you've received three blood transfusion you are still anemic.  Please monitor and immediately notify your PCP if you have bleeding, palpitations, fatigue, pale skin, cold skin, dizziness. Continue to take your iron supplment as prescribed.    Diagnosis: Pressure ulcer of coccygeal region, stage 1  Assessment and Plan of Treatment: You have a pressure injury on your coccyx.  Pressure injury comes from lying in bed or sitting in a wheelchair for too long.  While you were in the hospital you were seen by wound care and your coccyx was covered for protection.  You should continue to cover your coccyx with a foam dressing to protect and relieve some of the pressure. Covering your coccyx with a foam dressing helps to prevent further damage to your coccyx.  You are encouraged to change the foam dressing every three days or if it is soiled.

## 2022-02-01 NOTE — PROGRESS NOTE ADULT - PROBLEM SELECTOR PLAN 1
Pt with left hip pain s/p fall.  + left hip fracture.  s/p left hip hemiarthroplasty pod#1  High risk medications reviewed. Avoid polypharmacy. Avoid IV opioids. Avoid NSAIDs and benzodiazepines. Non-pharmacological sleep aides initiated. Non-opioid medications and non-pharmacological pain management measures initiated.  Mild pain - pain level 1-3  nonpharmacological measures  ice packs, heat packs, PT/OOB, guided imagery, distraction   Nonopioid recc  - Acetaminophen 1 gram po q 8 hours atc for 3 days   - Lidocaine 4% patch daily   Opioid Recc  - oxycodone 2.5mg po q 4 hours prn moderate pain      Bowel Regimen  - senna   OOB/Pt  + anemia - continue ferrous sulfate.  Nutrition consult.    Education provided to pt regarding the importance of taking pain meds and other meds Pt with left hip pain s/p fall.  + left hip fracture.  s/p left hip hemiarthroplasty pod#1  High risk medications reviewed. Avoid polypharmacy. Avoid IV opioids. Avoid NSAIDs and benzodiazepines. Non-pharmacological sleep aides initiated. Non-opioid medications and non-pharmacological pain management measures initiated.  Mild pain - pain level 1-3  nonpharmacological measures  ice packs, heat packs, PT/OOB, guided imagery, distraction   Nonopioid recc  - Acetaminophen 1 gram po q 8 hours atc for 3 days   - Lidocaine 4% patch daily   Opioid Recc  - oxycodone 2.5mg po q 4 hours prn moderate pain      Bowel Regimen  - senna   OOB/Pt  + anemia - continue ferrous sulfate. Pt to receive one unit of blood.  Nutrition consult.    Education provided to pt regarding the importance of taking pain meds and other meds

## 2022-02-01 NOTE — PROGRESS NOTE ADULT - SUBJECTIVE AND OBJECTIVE BOX
89yFemale    Diagnosis:  S/p L Hip Hemiarthroplasty POD# 1    Patient [facility] and [facility]  Patient with no acute complaints.   Pain is controlled to the LLE.   Denies CP/SOB, dyspnea, paresthesias, N/V/D, palpitations.     Vital Signs Last 24 Hrs  T(C): 36.7 (01 Feb 2022 05:08), Max: 37 (31 Jan 2022 17:40)  T(F): 98 (01 Feb 2022 05:08), Max: 98.6 (31 Jan 2022 17:40)  HR: 86 (01 Feb 2022 09:14) (61 - 86)  BP: 111/48 (01 Feb 2022 09:14) (108/51 - 154/59)  BP(mean): 68 (01 Feb 2022 05:08) (66 - 115)  RR: 17 (01 Feb 2022 05:08) (12 - 22)  SpO2: 98% (01 Feb 2022 09:14) (96% - 100%)  I&O's Detail    31 Jan 2022 07:01  -  01 Feb 2022 07:00  --------------------------------------------------------  IN:  Total IN: 0 mL    OUT:    Indwelling Catheter - Urethral (mL): 450 mL  Total OUT: 450 mL    Total NET: -450 mL    Physical Exam:    General:  NAD, resting comfortably in bed.    L Hip: Dressing is C/D/I. No erythema/ecchymosis, Skin is pink and warm. No erythema. SILT.  Wound with no drainage, healing well.   Lower extremity:  No calf tenderness, calves are soft. 2+pulses. NVI. (+)EHL/FHL/ADF/APF.  Good capillary refill. Warm, well-perfused.                           7.5    6.91  )-----------( 186      ( 01 Feb 2022 06:30 )             24.8     02-01    146<H>  |  110<H>  |  39<H>  ----------------------------<  94  4.0   |  29  |  1.54<H>    Ca    7.8<L>      01 Feb 2022 06:30  Phos  3.8     02-01  Mg     2.3     02-01    TPro  5.3<L>  /  Alb  1.7<L>  /  TBili  0.7  /  DBili  x   /  AST  19  /  ALT  18  /  AlkPhos  68  02-01      Impression:  89 y/o Female S/p L Hip Hemiarthroplasty POD# 1 w/acute blood loss anemia   Plan:  -  Pain control  -  Dvt prophylaxis  -  Daily Physical Therapy:  WBAT  to LLE  -  Discharge planning: Rehab  -  Incentive Spirometer  -  Hip precautions/abduction pillow  -  Continue with Post-op Antibiotics x 24hrs  -  D/w Dr. Hammer- Recommend to transfuse 1 unit PRBC today for acute blood loss anemia  -  Case d/w Dr. Wright and medical team

## 2022-02-01 NOTE — PROGRESS NOTE ADULT - PROBLEM SELECTOR PLAN 2
- BP stable  - C/w Amlodipine, Metoprolol, and Lasix  - Continue to monitor
Chronic anemia  Pt refusing blood transfusion   Hb 6.4  Follow anemia panel   H/H Q6  Monitor for signs of bleeding  Contact family if Hb continues to drop for possible transfusion
- BP stable  - C/w Amlodipine, Metoprolol, and Lasix  - Continue to monitor
Chronic anemia  Pt refusing blood transfusion   Hb 6.4  Follow anemia panel   H/H Q6  Monitor for signs of bleeding  Contact family if Hb continues to drop for possible transfusion

## 2022-02-01 NOTE — PHYSICAL THERAPY INITIAL EVALUATION ADULT - MD/RN NOTIFIED
yes Coumadin/Warfarin - Compliance.../Coumadin/Warfarin - Follow-up monitoring.../Coumadin/Warfarin - Dietary Advice.../Coumadin/Warfarin - Potential for adverse drug reactions and interactions

## 2022-02-01 NOTE — PHYSICAL THERAPY INITIAL EVALUATION ADULT - IMPAIRMENTS FOUND, PT EVAL
cognitive impairment/gait, locomotion, and balance/muscle strength/poor safety awareness/posture/ROM/ventilation and respiration/gas exchange

## 2022-02-01 NOTE — DISCHARGE NOTE PROVIDER - CARE PROVIDER_API CALL
Verna Wright)  Orthopaedic Surgery  [address] [place] [address], [address]  [place] [address]  Phone: [phone]  Fax: [phone]  Follow Up Time:     Blake Hammer)  Cardiology  [address] [place] [address]  Phone: [phone]  Fax: [phone]  Follow Up Time:

## 2022-02-01 NOTE — PHYSICAL THERAPY INITIAL EVALUATION ADULT - MANUAL MUSCLE TESTING RESULTS, REHAB EVAL
BUE 3+/5 except shoulder 2+/5, Rt hip 2-/5, Rt knee 2+/5. Lt hip at least 1+/5, Lt knee at least 2-/5. Ankels at least 2+/5 functionally

## 2022-02-01 NOTE — PROGRESS NOTE ADULT - PROBLEM SELECTOR PLAN 3
Continue all home medications
Continue all home medications
Home med: Levothyroxine 25mg daily  - C/w Levothyroxine
Home med: Levothyroxine 25mg daily  - C/w Levothyroxine

## 2022-02-01 NOTE — PROGRESS NOTE ADULT - SUBJECTIVE AND OBJECTIVE BOX
NP Note discussed with  primary attending    Patient is a 89y old  Female who presents with a chief complaint of fracture (01 Feb 2022 18:47)      INTERVAL HPI/OVERNIGHT EVENTS: Pt seen w/ dtLarissa at bedside translating [place].  Dtr reports pt does not want to eat or take medications, "all she wants to do is drink."  Reports pain medications relieve pt's pain.  No other concerns or complaints.      MEDICATIONS  (STANDING):  acetaminophen     Tablet .. 1000 milliGRAM(s) Oral every 6 hours  amLODIPine   Tablet 5 milliGRAM(s) Oral daily  budesonide  80 MICROgram(s)/formoterol 4.5 MICROgram(s) Inhaler 2 Puff(s) Inhalation two times a day  ferrous    sulfate Liquid 300 milliGRAM(s) Oral every 12 hours  furosemide    Tablet 40 milliGRAM(s) Oral two times a day  heparin   Injectable 5000 Unit(s) SubCutaneous every 8 hours  levothyroxine 25 MICROGram(s) Oral daily  metoprolol tartrate 25 milliGRAM(s) Oral two times a day  mirtazapine 7.5 milliGRAM(s) Oral at bedtime  polyethylene glycol 3350 17 Gram(s) Oral daily  senna 2 Tablet(s) Oral at bedtime    MEDICATIONS  (PRN):  magnesium hydroxide Suspension 30 milliLiter(s) Oral daily PRN Constipation  oxyCODONE    IR 2.5 milliGRAM(s) Oral every 4 hours PRN Severe Pain (7 - 10)      __________________________________________________  REVIEW OF SYSTEMS:    CONSTITUTIONAL: No fever,   EYES: No acute visual disturbances  NECK: No pain or stiffness  RESPIRATORY: No cough; No shortness of breath  CARDIOVASCULAR: No chest pain, no palpitations  GASTROINTESTINAL: No pain. No nausea or vomiting.  No diarrhea   NEUROLOGICAL: No headache or numbness, no tremors  MUSCULOSKELETAL: (+) Intermittent Left hip pain, no muscle pain  GENITOURINARY: No dysuria, no frequency, no hesitancy  PSYCHIATRY: No depression , no anxiety  ALL OTHER  ROS negative        Vital Signs Last 24 Hrs  T(C): 36.7 (01 Feb 2022 18:00), Max: 37.2 (01 Feb 2022 14:22)  T(F): 98.1 (01 Feb 2022 18:00), Max: 98.9 (01 Feb 2022 14:22)  HR: 71 (01 Feb 2022 18:00) (66 - 88)  BP: 113/40 (01 Feb 2022 18:00) (79/42 - 136/56)  BP(mean): 58 (01 Feb 2022 18:00) (48 - 68)  RR: 16 (01 Feb 2022 18:00) (16 - 18)  SpO2: 99% (01 Feb 2022 18:00) (93% - 100%)    ________________________________________________  PHYSICAL EXAM:  GENERAL: NAD  HEENT: Normocephalic;  conjunctivae and sclerae clear; moist mucous membranes;   NECK : Supple  CHEST/LUNG: Mile rales throughout to auscultation   HEART: S1 S2  regular; no murmurs, gallops or rubs  ABDOMEN: Soft, Nontender, Nondistended; Bowel sounds present x 4 quad  EXTREMITIES: No cyanosis; no edema; no calf tenderness.  (+) Left hip dsg dry and intact    SKIN: Warm and dry; no rash. St. 1 coccyx  NERVOUS SYSTEM:  Awake, alert to self; not oriented  to place and time ; no new deficits  _________________________________________________  LABS:                        7.5    6.91  )-----------( 186      ( 01 Feb 2022 06:30 )             24.8     02-01    146<H>  |  110<H>  |  39<H>  ----------------------------<  94  4.0   |  29  |  1.54<H>    Ca    7.8<L>      01 Feb 2022 06:30  Phos  3.8     02-01  Mg     2.3     02-01    TPro  5.3<L>  /  Alb  1.7<L>  /  TBili  0.7  /  DBili  x   /  AST  19  /  ALT  18  /  AlkPhos  68  02-01    PT/INR - ( 31 Jan 2022 07:22 )   PT: 13.2 sec;   INR: 1.12 ratio         PTT - ( 31 Jan 2022 07:22 )  PTT:36.6 sec    CAPILLARY BLOOD GLUCOSE      POCT Blood Glucose.: 111 mg/dL (01 Feb 2022 16:34)  POCT Blood Glucose.: 125 mg/dL (01 Feb 2022 11:44)  POCT Blood Glucose.: 84 mg/dL (01 Feb 2022 07:39)  POCT Blood Glucose.: 86 mg/dL (01 Feb 2022 06:12)  POCT Blood Glucose.: 74 mg/dL (01 Feb 2022 05:03)  POCT Blood Glucose.: 84 mg/dL (31 Jan 2022 23:04)        RADIOLOGY & ADDITIONAL TESTS:    Imaging Personally Reviewed:  YES    Consultant(s) Notes Reviewed:   YES    Care Discussed with Consultants :     Plan of care was discussed with patient and /or primary care giver; all questions and concerns were addressed and care was aligned with patient's wishes.

## 2022-02-01 NOTE — PROGRESS NOTE ADULT - PROBLEM/PLAN-6
DISPLAY PLAN FREE TEXT
detailed exam

## 2022-02-01 NOTE — PROGRESS NOTE ADULT - PROBLEM SELECTOR PLAN 7
- C/w Heparin for DVT ppx
IMPROVE VTE Individual Risk Assessment  RISK                                                                Points  [  ] Previous VTE                                                  3  [  ] Thrombophilia                                               2  [  ] Lower limb paralysis                                      2        (unable to hold up >15 seconds)    [  ] Current Cancer                                              2         (within 6 months)  [x  ] Immobilization > 24 hrs                                1  [  ] ICU/CCU stay > 24 hours                              1  [x  ] Age > 60                                                      1  IMPROVE VTE Score _________2, -- for DVT proph  heparin sq
- C/w Heparin for DVT ppx
IMPROVE VTE Individual Risk Assessment  RISK                                                                Points  [  ] Previous VTE                                                  3  [  ] Thrombophilia                                               2  [  ] Lower limb paralysis                                      2        (unable to hold up >15 seconds)    [  ] Current Cancer                                              2         (within 6 months)  [x  ] Immobilization > 24 hrs                                1  [  ] ICU/CCU stay > 24 hours                              1  [x  ] Age > 60                                                      1  IMPROVE VTE Score _________2, -- for DVT proph  heparin sq

## 2022-02-01 NOTE — PHYSICAL THERAPY INITIAL EVALUATION ADULT - PRECAUTIONS/LIMITATIONS, REHAB EVAL
POSTERIOR HIP PRECAUTIONS reviewed with patient, including. NO FLEXION of surgical hip beyond 90 degrees; NO ADDuction of surgical limb past midline of the body; NO Internal rotation of surgical limb./fall precautions

## 2022-02-02 VITALS
TEMPERATURE: 98 F | OXYGEN SATURATION: 100 % | HEART RATE: 79 BPM | SYSTOLIC BLOOD PRESSURE: 122 MMHG | RESPIRATION RATE: 20 BRPM | DIASTOLIC BLOOD PRESSURE: 47 MMHG

## 2022-02-02 LAB
ANION GAP SERPL CALC-SCNC: 9 MMOL/L — SIGNIFICANT CHANGE UP (ref 5–17)
BUN SERPL-MCNC: 46 MG/DL — HIGH (ref 7–18)
CALCIUM SERPL-MCNC: 7.6 MG/DL — LOW (ref 8.4–10.5)
CHLORIDE SERPL-SCNC: 102 MMOL/L — SIGNIFICANT CHANGE UP (ref 96–108)
CO2 SERPL-SCNC: 27 MMOL/L — SIGNIFICANT CHANGE UP (ref 22–31)
CREAT SERPL-MCNC: 2.04 MG/DL — HIGH (ref 0.5–1.3)
GLUCOSE SERPL-MCNC: 85 MG/DL — SIGNIFICANT CHANGE UP (ref 70–99)
HCT VFR BLD CALC: 25 % — LOW (ref 34.5–45)
HGB BLD-MCNC: 8.2 G/DL — LOW (ref 11.5–15.5)
MCHC RBC-ENTMCNC: 23.9 PG — LOW (ref 27–34)
MCHC RBC-ENTMCNC: 32.8 GM/DL — SIGNIFICANT CHANGE UP (ref 32–36)
MCV RBC AUTO: 72.9 FL — LOW (ref 80–100)
NRBC # BLD: 0 /100 WBCS — SIGNIFICANT CHANGE UP (ref 0–0)
PLATELET # BLD AUTO: 177 K/UL — SIGNIFICANT CHANGE UP (ref 150–400)
POTASSIUM SERPL-MCNC: 4.4 MMOL/L — SIGNIFICANT CHANGE UP (ref 3.5–5.3)
POTASSIUM SERPL-SCNC: 4.4 MMOL/L — SIGNIFICANT CHANGE UP (ref 3.5–5.3)
RBC # BLD: 3.43 M/UL — LOW (ref 3.8–5.2)
RBC # FLD: 23.8 % — HIGH (ref 10.3–14.5)
SODIUM SERPL-SCNC: 138 MMOL/L — SIGNIFICANT CHANGE UP (ref 135–145)
T3 SERPL-MCNC: 76 NG/DL — SIGNIFICANT CHANGE UP
WBC # BLD: 8.99 K/UL — SIGNIFICANT CHANGE UP (ref 3.8–10.5)
WBC # FLD AUTO: 8.99 K/UL — SIGNIFICANT CHANGE UP (ref 3.8–10.5)

## 2022-02-02 PROCEDURE — 93005 ELECTROCARDIOGRAM TRACING: CPT

## 2022-02-02 PROCEDURE — 83550 IRON BINDING TEST: CPT

## 2022-02-02 PROCEDURE — 84439 ASSAY OF FREE THYROXINE: CPT

## 2022-02-02 PROCEDURE — 87186 SC STD MICRODIL/AGAR DIL: CPT

## 2022-02-02 PROCEDURE — 94640 AIRWAY INHALATION TREATMENT: CPT

## 2022-02-02 PROCEDURE — 86923 COMPATIBILITY TEST ELECTRIC: CPT

## 2022-02-02 PROCEDURE — 82728 ASSAY OF FERRITIN: CPT

## 2022-02-02 PROCEDURE — 97162 PT EVAL MOD COMPLEX 30 MIN: CPT

## 2022-02-02 PROCEDURE — C9399: CPT

## 2022-02-02 PROCEDURE — 96375 TX/PRO/DX INJ NEW DRUG ADDON: CPT

## 2022-02-02 PROCEDURE — 36430 TRANSFUSION BLD/BLD COMPNT: CPT

## 2022-02-02 PROCEDURE — 72170 X-RAY EXAM OF PELVIS: CPT

## 2022-02-02 PROCEDURE — 85610 PROTHROMBIN TIME: CPT

## 2022-02-02 PROCEDURE — 73502 X-RAY EXAM HIP UNI 2-3 VIEWS: CPT

## 2022-02-02 PROCEDURE — 82962 GLUCOSE BLOOD TEST: CPT

## 2022-02-02 PROCEDURE — 80053 COMPREHEN METABOLIC PANEL: CPT

## 2022-02-02 PROCEDURE — 99285 EMERGENCY DEPT VISIT HI MDM: CPT | Mod: 25

## 2022-02-02 PROCEDURE — 83880 ASSAY OF NATRIURETIC PEPTIDE: CPT

## 2022-02-02 PROCEDURE — 84484 ASSAY OF TROPONIN QUANT: CPT

## 2022-02-02 PROCEDURE — 83735 ASSAY OF MAGNESIUM: CPT

## 2022-02-02 PROCEDURE — 96374 THER/PROPH/DIAG INJ IV PUSH: CPT

## 2022-02-02 PROCEDURE — 86850 RBC ANTIBODY SCREEN: CPT

## 2022-02-02 PROCEDURE — 85730 THROMBOPLASTIN TIME PARTIAL: CPT

## 2022-02-02 PROCEDURE — 80048 BASIC METABOLIC PNL TOTAL CA: CPT

## 2022-02-02 PROCEDURE — 99232 SBSQ HOSP IP/OBS MODERATE 35: CPT

## 2022-02-02 PROCEDURE — 84100 ASSAY OF PHOSPHORUS: CPT

## 2022-02-02 PROCEDURE — 36415 COLL VENOUS BLD VENIPUNCTURE: CPT

## 2022-02-02 PROCEDURE — 81001 URINALYSIS AUTO W/SCOPE: CPT

## 2022-02-02 PROCEDURE — 86901 BLOOD TYPING SEROLOGIC RH(D): CPT

## 2022-02-02 PROCEDURE — 88311 DECALCIFY TISSUE: CPT

## 2022-02-02 PROCEDURE — 83540 ASSAY OF IRON: CPT

## 2022-02-02 PROCEDURE — 93306 TTE W/DOPPLER COMPLETE: CPT

## 2022-02-02 PROCEDURE — 96376 TX/PRO/DX INJ SAME DRUG ADON: CPT

## 2022-02-02 PROCEDURE — 71045 X-RAY EXAM CHEST 1 VIEW: CPT

## 2022-02-02 PROCEDURE — C1776: CPT

## 2022-02-02 PROCEDURE — 88305 TISSUE EXAM BY PATHOLOGIST: CPT

## 2022-02-02 PROCEDURE — 87086 URINE CULTURE/COLONY COUNT: CPT

## 2022-02-02 PROCEDURE — 85027 COMPLETE CBC AUTOMATED: CPT

## 2022-02-02 PROCEDURE — 86900 BLOOD TYPING SEROLOGIC ABO: CPT

## 2022-02-02 PROCEDURE — 83036 HEMOGLOBIN GLYCOSYLATED A1C: CPT

## 2022-02-02 PROCEDURE — P9040: CPT

## 2022-02-02 PROCEDURE — 84443 ASSAY THYROID STIM HORMONE: CPT

## 2022-02-02 PROCEDURE — 87635 SARS-COV-2 COVID-19 AMP PRB: CPT

## 2022-02-02 PROCEDURE — 84480 ASSAY TRIIODOTHYRONINE (T3): CPT

## 2022-02-02 PROCEDURE — 85025 COMPLETE CBC W/AUTO DIFF WBC: CPT

## 2022-02-02 RX ORDER — POLYETHYLENE GLYCOL 3350 17 G/17G
17 POWDER, FOR SOLUTION ORAL
Qty: 0 | Refills: 0 | DISCHARGE
Start: 2022-02-02

## 2022-02-02 RX ORDER — HEPARIN SODIUM 5000 [USP'U]/ML
5000 INJECTION INTRAVENOUS; SUBCUTANEOUS
Qty: 0 | Refills: 0 | DISCHARGE
Start: 2022-02-02

## 2022-02-02 RX ORDER — TRAMADOL HYDROCHLORIDE 50 MG/1
1 TABLET ORAL
Qty: 0 | Refills: 0 | DISCHARGE

## 2022-02-02 RX ORDER — SODIUM CHLORIDE 9 MG/ML
1000 INJECTION INTRAMUSCULAR; INTRAVENOUS; SUBCUTANEOUS
Refills: 0 | Status: DISCONTINUED | OUTPATIENT
Start: 2022-02-02 | End: 2022-02-02

## 2022-02-02 RX ORDER — ACETAMINOPHEN 500 MG
2 TABLET ORAL
Qty: 0 | Refills: 0 | DISCHARGE
Start: 2022-02-02

## 2022-02-02 RX ORDER — MAGNESIUM HYDROXIDE 400 MG/1
30 TABLET, CHEWABLE ORAL
Qty: 0 | Refills: 0 | DISCHARGE
Start: 2022-02-02

## 2022-02-02 RX ORDER — OXYCODONE HYDROCHLORIDE 5 MG/1
2.5 TABLET ORAL
Qty: 0 | Refills: 0 | DISCHARGE
Start: 2022-02-02

## 2022-02-02 RX ADMIN — OXYCODONE HYDROCHLORIDE 2.5 MILLIGRAM(S): 5 TABLET ORAL at 11:30

## 2022-02-02 RX ADMIN — Medication 1000 MILLIGRAM(S): at 05:26

## 2022-02-02 RX ADMIN — HEPARIN SODIUM 5000 UNIT(S): 5000 INJECTION INTRAVENOUS; SUBCUTANEOUS at 13:15

## 2022-02-02 RX ADMIN — BUDESONIDE AND FORMOTEROL FUMARATE DIHYDRATE 2 PUFF(S): 160; 4.5 AEROSOL RESPIRATORY (INHALATION) at 09:19

## 2022-02-02 RX ADMIN — HEPARIN SODIUM 5000 UNIT(S): 5000 INJECTION INTRAVENOUS; SUBCUTANEOUS at 05:14

## 2022-02-02 RX ADMIN — OXYCODONE HYDROCHLORIDE 2.5 MILLIGRAM(S): 5 TABLET ORAL at 12:40

## 2022-02-02 NOTE — DISCHARGE NOTE NURSING/CASE MANAGEMENT/SOCIAL WORK - PATIENT PORTAL LINK FT
You can access the FollowMyHealth Patient Portal offered by [facility] by registering at the following website: [email or web address]. By joining [email or web address]’s FollowMyHealth portal, you will also be able to view your health information using other applications (apps) compatible with our system.

## 2022-02-02 NOTE — PROGRESS NOTE ADULT - SUBJECTIVE AND OBJECTIVE BOX
89yFemale    Diagnosis:  S/p L Hip Hemiarthroplasty POD# 2    Patient was seen and evaluated at bedside. [facility] -[id number] used. Patient with no acute complaints.   Pain is controlled to the LLE. Pt is s/p 1 unit PRBC on 2/1/2022.   Denies CP/SOB, dyspnea, paresthesias, N/V/D, palpitations. (+)Grimaldo.     Vital Signs Last 24 Hrs  T(C): 36.8 (02 Feb 2022 05:02), Max: 37.2 (01 Feb 2022 14:22)  T(F): 98.2 (02 Feb 2022 05:02), Max: 98.9 (01 Feb 2022 14:22)  HR: 75 (02 Feb 2022 05:02) (71 - 88)  BP: 99/62 (02 Feb 2022 05:02) (79/42 - 113/40)  BP(mean): 74 (02 Feb 2022 05:02) (48 - 76)  RR: 20 (02 Feb 2022 05:02) (16 - 20)  SpO2: 96% (02 Feb 2022 05:02) (93% - 99%)  I&O's Detail    01 Feb 2022 07:01  -  02 Feb 2022 07:00  --------------------------------------------------------  IN:  Total IN: 0 mL    OUT:    Indwelling Catheter - Urethral (mL): 350 mL  Total OUT: 350 mL    Total NET: -350 mL        Physical Exam:    General: AAOx3, NAD, resting comfortably in bed.    L Hip: Aquacell dressing is C/D/I. Skin is pink and warm. No erythema. SILT.  Wound with no drainage, healing well.   Lower extremity: Abduction pillow intact, No calf tenderness, calves are soft. 2+pulses. NVI. (+)EHL/FHL/ADF/APF.  Good capillary refill. Warm, well-perfused.                           8.2    8.99  )-----------( 177      ( 02 Feb 2022 06:01 )             25.0     02-02    138  |  102  |  46<H>  ----------------------------<  85  4.4   |  27  |  2.04<H>    Ca    7.6<L>      02 Feb 2022 06:01  Phos  3.8     02-01  Mg     2.3     02-01    TPro  5.3<L>  /  Alb  1.7<L>  /  TBili  0.7  /  DBili  x   /  AST  19  /  ALT  18  /  AlkPhos  68  02-01      Impression:  89 y/o Female S/p L Hip Hemiarthroplasty POD# 2  Plan:  -  Pain control  -  DVT prophylaxis  -  Daily Physical Therapy:  WBAT  to LLE  -  Discharge planning: Rehab  -  Incentive Spirometer  -  Hip precautions/abduction pillow  -  Case d/w Dr. Wright  -  Case d/w medical team- continue with grimaldo and fluids ordered    89yFemale    Diagnosis:  S/p L Hip Hemiarthroplasty POD# 2    Patient was seen and evaluated at bedside. [facility] -[id number] used. Patient with no acute complaints.   Pain is controlled to the LLE. Pt is s/p 1 unit PRBC on 2/1/2022.   Denies CP/SOB, dyspnea, paresthesias, N/V/D, palpitations. (+)Grimaldo.     Vital Signs Last 24 Hrs  T(C): 36.8 (02 Feb 2022 05:02), Max: 37.2 (01 Feb 2022 14:22)  T(F): 98.2 (02 Feb 2022 05:02), Max: 98.9 (01 Feb 2022 14:22)  HR: 75 (02 Feb 2022 05:02) (71 - 88)  BP: 99/62 (02 Feb 2022 05:02) (79/42 - 113/40)  BP(mean): 74 (02 Feb 2022 05:02) (48 - 76)  RR: 20 (02 Feb 2022 05:02) (16 - 20)  SpO2: 96% (02 Feb 2022 05:02) (93% - 99%)  I&O's Detail    01 Feb 2022 07:01  -  02 Feb 2022 07:00  --------------------------------------------------------  IN:  Total IN: 0 mL    OUT:    Indwelling Catheter - Urethral (mL): 350 mL  Total OUT: 350 mL    Total NET: -350 mL        Physical Exam:    General: AAOx3, NAD, resting comfortably in bed.    L Hip: Aquacell dressing is C/D/I. Skin is pink and warm. No erythema. SILT.  Wound with no drainage, healing well.   Lower extremity: Abduction pillow intact, No calf tenderness, calves are soft. 2+pulses. NVI. (+)EHL/FHL/ADF/APF.  Good capillary refill. Warm, well-perfused.                           8.2    8.99  )-----------( 177      ( 02 Feb 2022 06:01 )             25.0     02-02    138  |  102  |  46<H>  ----------------------------<  85  4.4   |  27  |  2.04<H>    Ca    7.6<L>      02 Feb 2022 06:01  Phos  3.8     02-01  Mg     2.3     02-01    TPro  5.3<L>  /  Alb  1.7<L>  /  TBili  0.7  /  DBili  x   /  AST  19  /  ALT  18  /  AlkPhos  68  02-01      Impression:  89 y/o Female S/p L Hip Hemiarthroplasty POD# 2  Plan:  -  Pain control  -  DVT prophylaxis  -  Daily Physical Therapy:  WBAT  to LLE  -  Discharge planning: Rehab  -  Incentive Spirometer  -  Hip precautions/abduction pillow  -  Case d/w Dr. Wright  -  Case d/w Dr. Hammer- will evaluate patient/overnight rhythm shortly  -  Case d/w medical team- continue with grimaldo and fluids ordered

## 2022-02-02 NOTE — DISCHARGE NOTE NURSING/CASE MANAGEMENT/SOCIAL WORK - NSDCPEFALRISK_GEN_ALL_CORE
For information on Fall & Injury Prevention, visit: [email or web address] OR  [email or web address] OR  https://www.cdc.gov/steadi/patient.html

## 2022-02-02 NOTE — PROGRESS NOTE ADULT - SUBJECTIVE AND OBJECTIVE BOX
Source of information: JUAN DIEGO SANFORD, Chart review  Patient language: Fernando   :     HPI:  89 year old female PMH COPD, CHF, HTN, hypothyroid coming in s/p fall with left hip pain. Used [employer]  # [id number] but patient was  was unable to hear the patient as she was speaking very low. Called daughter, Jamila, who stated she was told her mother fell in the bathroom at the NH for which she was brought to the ED. She states her mother has chronic anemia and does not want to prolong her suffering so refuses blood transfusions. She is tired of living in pain and has made it clear to family that she wants to be DNR/DNI. Molst form in NH papers.  (28 Jan 2022 03:37)      GOHCO s/p left hip hemiarthroplasty 1/31, pod#2. Pain consulted for postop pain. Pt seen and examined at bedside. Sleepy, laying in bed, no nonverbal s/s pain noted.   PAST MEDICAL & SURGICAL HISTORY:  HTN (hypertension)    Hypothyroid    COPD (chronic obstructive pulmonary disease)    Heart failure        FAMILY HISTORY: Unable to obtain      Social History:  Unable to obtain    Allergies    No Known Allergies    MEDICATIONS  (STANDING):  acetaminophen     Tablet .. 1000 milliGRAM(s) Oral every 6 hours  amLODIPine   Tablet 5 milliGRAM(s) Oral daily  budesonide  80 MICROgram(s)/formoterol 4.5 MICROgram(s) Inhaler 2 Puff(s) Inhalation two times a day  ferrous    sulfate Liquid 300 milliGRAM(s) Oral every 12 hours  furosemide    Tablet 40 milliGRAM(s) Oral two times a day  heparin   Injectable 5000 Unit(s) SubCutaneous every 8 hours  levothyroxine 25 MICROGram(s) Oral daily  metoprolol tartrate 25 milliGRAM(s) Oral two times a day  mirtazapine 7.5 milliGRAM(s) Oral at bedtime  polyethylene glycol 3350 17 Gram(s) Oral daily  senna 2 Tablet(s) Oral at bedtime  sodium chloride 0.9%. 1000 milliLiter(s) (50 mL/Hr) IV Continuous <Continuous>    MEDICATIONS  (PRN):  magnesium hydroxide Suspension 30 milliLiter(s) Oral daily PRN Constipation  oxyCODONE    IR 2.5 milliGRAM(s) Oral every 4 hours PRN Severe Pain (7 - 10)      Vital Signs Last 24 Hrs  T(C): 36.8 (02 Feb 2022 05:02), Max: 37.2 (01 Feb 2022 14:22)  T(F): 98.2 (02 Feb 2022 05:02), Max: 98.9 (01 Feb 2022 14:22)  HR: 75 (02 Feb 2022 05:02) (71 - 88)  BP: 99/62 (02 Feb 2022 05:02) (79/42 - 113/40)  BP(mean): 74 (02 Feb 2022 05:02) (48 - 76)  RR: 20 (02 Feb 2022 05:02) (16 - 20)  SpO2: 96% (02 Feb 2022 05:02) (93% - 99%)  COVID-19 PCR: NotDetec (31 Jan 2022 05:31)  COVID-19 PCR: NotDetec (28 Jan 2022 01:43)    LABS: Reviewed                          8.2    8.99  )-----------( 177      ( 02 Feb 2022 06:01 )             25.0     02-02    138  |  102  |  46<H>  ----------------------------<  85  4.4   |  27  |  2.04<H>    Ca    7.6<L>      02 Feb 2022 06:01  Phos  3.8     02-01  Mg     2.3     02-01    TPro  5.3<L>  /  Alb  1.7<L>  /  TBili  0.7  /  DBili  x   /  AST  19  /  ALT  18  /  AlkPhos  68  02-01      LIVER FUNCTIONS - ( 01 Feb 2022 06:30 )  Alb: 1.7 g/dL / Pro: 5.3 g/dL / ALK PHOS: 68 U/L / ALT: 18 U/L DA / AST: 19 U/L / GGT: x             CAPILLARY BLOOD GLUCOSE      POCT Blood Glucose.: 146 mg/dL (02 Feb 2022 11:12)  POCT Blood Glucose.: 193 mg/dL (02 Feb 2022 07:36)  POCT Blood Glucose.: 94 mg/dL (01 Feb 2022 21:36)  POCT Blood Glucose.: 111 mg/dL (01 Feb 2022 16:34)  POCT Blood Glucose.: 125 mg/dL (01 Feb 2022 11:44)    COVID-19 PCR: NotDetec (31 Jan 2022 05:31)  COVID-19 PCR: NotDetec (28 Jan 2022 01:43)      Radiology: Reviewed  < from: Xray Pelvis AP only (01.31.22 @ 18:02) >    ACC: [id number] EXAM:  XR PELVIS AP ONLY 1-2 VIEWS                          PROCEDURE DATE:  01/31/2022          INTERPRETATION:  HISTORY: Hemiarthroplasty    TECHNIQUE: AP pelvic/LEFT hip postoperative radiograph    Findings: Evaluation demonstratesboth femoral and acetabular components   well-seated and in good anatomic alignment.    There is no fracture.    The visualized pelvis is within normal limits.    IMPRESSION:  Prosthetic Hip replacement in proper anatomical alignment.    --- End of Report ---            VADIM ORTEGA MD; Attending Radiologist  This document has been electronically signed. Feb 1 2022  9:54AM    < end of copied text >      ORT Score -   Family Hx of substance abuse	Female	      Male  Alcohol 	                                           1                     3  Illegal drugs	                                   2                     3  Rx drugs                                           4 	                  4  Personal Hx of substance abuse		  Alcohol 	                                          3	                  3  Illegal drugs                                     4	                  4  Rx drugs                                            5 	                  5  Age between 16- 45 years	           1                     1  hx preadolescent sexual abuse	   3 	                  0  Psychological disease		  ADD, OCD, bipolar, schizophrenia   2	          2  Depression                                           1 	          1  Total: 0    a score of 3 or lower indicates low risk for opioid abuse		  a score of 4-7 indicates moderate risk for opioid abuse		  a score of 8 or higher indicates high risk for opioid abuse    REVIEW OF SYSTEMS:  CONSTITUTIONAL: No fever or fatigue  RESPIRATORY: No cough, wheezing, chills or hemoptysis; No shortness of breath  CARDIOVASCULAR: No chest pain, palpitations, dizziness, or leg swelling  GASTROINTESTINAL: No loss of appetite, decreased PO intake. No abdominal or epigastric pain. No nausea, vomiting; No diarrhea or constipation.   GENITOURINARY: No dysuria, frequency, hematuria, retention or incontinence  MUSCULOSKELETAL: + left hip pain; No muscle, back, or extremity pain, no upper or lower motor strength weakness, no saddle anesthesia, bowel/bladder incontinence, + falls   NEURO: No headaches, No numbness/tingling b/l LE, No weakness    PHYSICAL EXAM:  GENERAL:  Alert & Oriented X4, cooperative, NAD, Good concentration. Speech is clear.   RESPIRATORY: Respirations even and unlabored. Clear to auscultation bilaterally; No rales, rhonchi, wheezing, or rubs  CARDIOVASCULAR: Normal S1/S2, regular rate and rhythm; No murmurs, rubs, or gallops. No JVD.   GASTROINTESTINAL:  Soft, Nontender, Nondistended; Bowel sounds present  PERIPHERAL VASCULAR:  Extremities warm without edema. 2+ Peripheral Pulses, No cyanosis, No calf tenderness  MUSCULOSKELETAL: Motor Strength 5/5 B/L upper and lower extremities; moves all extremities equally against gravity; ROM intact; negative SLR; + left hip tenderness on palpation SKIN: + left hip dressing c/d/i; Warm, dry, intact. No rashes, lesions, scars or wounds.     Risk factors associated with adverse outcomes related to opioid treatment  [ ]  Concurrent benzodiazepine use  [ ]  History/ Active substance use or alcohol use disorder  [ ] Psychiatric co-morbidity  [ ] Sleep apnea  [ ] COPD  [ ] BMI> 35  [ ] Liver dysfunction  [ ] Renal dysfunction  [ ] CHF  [ ] Smoker  [X ]  Age > 60 years    [X ]  [place]  Reviewed and Copied to Chart. See below.    Plan of care and goal oriented pain management treatment options were discussed with patient and /or primary care giver; all questions and concerns were addressed and care was aligned with patient's wishes.    Educated patient on goal oriented pain management treatment options     02-02-22 @ 11:18

## 2022-02-02 NOTE — CHART NOTE - NSCHARTNOTEFT_GEN_A_CORE
EVENT: Paged by RN, reports reviewing "irregular rhythm on telemetry."     HPI: 89yo female PMH COPD, CHF, HTN, hypothyroid coming in s/p fall with left hip pain. x-ray  Impacted left basicervical fracture.  S/p Left Hip Hemiarthroplasty POD#2.     SUBJECTIVE: Pt seen and examined at bedside, appears in no acute distress. Pt is able to answer simple questions in English. Denies chest pain or shortness of breath. Prior EKG reviewed in chart showing an underlying RBBB. STAT EKG performed at bedside without significant change from previous (see in chart). ECHO from 1/28 reviewed (see below).     OBJECTIVE:  Vital Signs Last 24 Hrs  T(C): 37.1 (01 Feb 2022 21:09), Max: 37.2 (01 Feb 2022 14:22)  T(F): 98.7 (01 Feb 2022 21:09), Max: 98.9 (01 Feb 2022 14:22)  HR: 75 (02 Feb 2022 00:40) (71 - 88)  BP: 106/61 (02 Feb 2022 00:40) (79/42 - 134/43)  BP(mean): 76 (01 Feb 2022 21:09) (48 - 76)  RR: 20 (02 Feb 2022 00:40) (16 - 20)  SpO2: 97% (02 Feb 2022 00:40) (93% - 100%)    PHYSICAL EXAM:  Neuro: Awake and alert, oriented to person, place  Cardiovascular: + S1, S2, no murmurs, rubs, or bruits  Respiratory: clear to auscultation bilaterally with good air entry   GI: Abdomen soft, non-tender, bowel sounds present   : Non distended;   Skin: Left hip Dressing is C/D/I. No erythema/ecchymosis noted.     LABS:                        7.5    6.91  )-----------( 186      ( 01 Feb 2022 06:30 )             24.8     02-01    146<H>  |  110<H>  |  39<H>  ----------------------------<  94  4.0   |  29  |  1.54<H>    Ca    7.8<L>      01 Feb 2022 06:30  Phos  3.8     02-01  Mg     2.3     02-01    TPro  5.3<L>  /  Alb  1.7<L>  /  TBili  0.7  /  DBili  x   /  AST  19  /  ALT  18  /  AlkPhos  68  02-01        EKG: Reviewed and in chart     IMAGING:    ECHO  Study Date: 1/28/2022CONCLUSIONS:  1. Mitral annular calcification. Trace mitral  regurgitation.  2. Calcified trileaflet aortic valve with decreased  opening. Peak transaortic valve gradient equals 43.6 mm Hg,  mean transaortic valve gradient equals 20 mm Hg, estimated  aortic valve area equals 1 sqcm (by continuity equation),  consistent with severe aortic stenosis. The dimensionless  index is .35. Mild aortic regurgitation.  3. Aortic Root: 3.0 cm.  4. Severely dilated left atrium.  LA volume index = 62  cc/m2.  5. Normal left ventricular internal dimensions and wall  thicknesses.  6. Normal Left Ventricular Systolic Function,  (EF = 55 to  60%) Peak left ventricular outflow tract gradient equals  4.8 mm Hg.  7. Grade II diastolic dysfunction (moderate).  8. Normal right atrium.  9. Normal right ventricular size and systolic function  (TAPSE  2.3cm).  10. RA Pressure is 8 mm Hg.  11. RV systolic pressure is severely increased at  63 mm  Hg.  12. There is mild tricuspid regurgitation.  13. There is mild pulmonic regurgitation.  14. Normal pericardium with no pericardial effusion.      ASSESSMENT: Abnormal EKG    PLAN:     -STAT EKG   -Continue current/supportive care  -F/U with Cardiology this AM    FOLLOW UP / RESULT:     -Plan as above

## 2022-02-02 NOTE — PROGRESS NOTE ADULT - PROBLEM SELECTOR PROBLEM 2
DM (diabetes mellitus)
Anemia
Anemia
DM (diabetes mellitus)
DM (diabetes mellitus)
HTN (hypertension)
HTN (hypertension)

## 2022-02-02 NOTE — PROGRESS NOTE ADULT - REASON FOR ADMISSION
fracture

## 2022-02-02 NOTE — PROGRESS NOTE ADULT - PROBLEM SELECTOR PROBLEM 1
Hip fracture, left

## 2022-02-02 NOTE — DISCHARGE NOTE NURSING/CASE MANAGEMENT/SOCIAL WORK - NSDCVIVACCINE_GEN_ALL_CORE_FT
Here with mom    Nathaniel is a 4 year old female who presents with complaints of a possible urinary tract infection.  For the last 2 day(s), the she has had intermittent dysuria.  She has also had some intermittent abdominal pain throughout the week.   She denies frequency, urgency, hematuria, flank pain, fevers and vomiting.  She Has not had these in the past.  She Denies vaginal discharge.  She did have a large, hard stool earlier in the week.    --Gastrointestinal: No diarrhea, constipation, abdominal pain or other complaints noted  --DM: no  --SOB:  no    PMH: No past medical history on file.   Alg: ALLERGIES: no known allergies.  Meds:   Current Outpatient Medications   Medication Sig Dispense Refill   • mupirocin (BACTROBAN) 2 % ointment Apply topically 2 times daily. To treated areas 22 g 2   • hydroCORTisone (CORTIZONE) 2.5 % ointment Apply topically 2 times daily. For up to two weeks to eczema patches 30 g 1   • UNKNOWN TO PATIENT Cantharidin 0.7% to be applied at the clinic as directed to molluscum 1 Bottle 0   • Pediatric Multiple Vit-C-FA (MULTIVITAMIN CHILDRENS) Chew Tab      • Lactobacillus (PROBIOTIC CHILDRENS) Chew Tab        No current facility-administered medications for this visit.       Immunizations:  up to date     Gen: alert, no distress  HEENT:   No conjunctival injection or scleral icterus               EOMI/PERRL; no photophobia               No pharyngeal erythema               Mucous-membranes moist  Lungs: Clear to auscultation; good air entry         no use of accessory muscles  CV:    regular rate and rhythm; no murmurs  Abd:   soft, positive bowel sounds, non-distended         No tenderness to palpation         no masses or HSM         No CVA tenderness   Ext:   no cyanosis, clubbing or edema    UA:    Results for orders placed or performed in visit on 06/01/19   URINALYSIS DIPSTICK ONLY   Result Value    COLOR YELLOW    CLARITY CLEAR    GLUCOSE QUALITATIVE U NEGATIVE    BILIRUBIN  URINE NEGATIVE    KETONES, URINE NEGATIVE    SPECIFIC GRAVITY URINE 1.010    BLOOD URINE NEGATIVE    PH URINE 7.5 (A)    URINE PROTEIN, QUAL (DIPSTICK) NEGATIVE    UROBILINOGEN URINE 0.2    NITRITE URINE NEGATIVE    LEUKOCYTE ESTERASE URINE TRACE (A)   URINE MICROSCOPIC ONLY   Result Value    WHITE BLOOD CELLS URINE 0-2    RED BLOOD CELLS URINE NONE    SQUAMOUS EPITHELIAL CELLS TRACE (A)   URINE CULTURE   Result Value    FINAL REPORT Microbiology results     Comment: [6/3/2019 9:41 AM Lashonda Delvalle]  URINE SOURCE  Clean Catch  RESULT  5,000 CFU/ML MIXED GRAM POSITIVE LACEY            Assessment:Plan:  Abdominal Pain  Dysuria      I discussed this with the patient's mom.  I suspect this is due to constipation.  Increased fluids, cranberry juice.  OTC pyridium prn.  A urine culture is pending.  If her culture is negative and she is still symptomatic, then she should follow-up either here or with her PCP for a recheck.   The pt's parent(s) indicates understanding and agrees with the plan of care.     Electronically signed by:  Keith E Weiler, MD on 6/3/2019    No Vaccines Administered.

## 2022-02-02 NOTE — PROGRESS NOTE ADULT - PROBLEM SELECTOR PROBLEM 3
Left hip pain
HTN (hypertension)
HTN (hypertension)
Left hip pain
Left hip pain
Hypothyroidism
Hypothyroidism

## 2022-02-02 NOTE — PROGRESS NOTE ADULT - PROBLEM SELECTOR PLAN 1
Pt with left hip pain s/p fall.  GOHCO. + left hip fracture.  s/p left hip hemiarthroplasty 1/31, pod#2.  High risk medications reviewed. Avoid polypharmacy. Avoid IV opioids. Avoid NSAIDs and benzodiazepines. Non-pharmacological sleep aides initiated. Non-opioid medications and non-pharmacological pain management measures initiated.  Mild pain - pain level 1-3  nonpharmacological measures  ice packs, heat packs, PT/OOB, guided imagery, distraction   Nonopioid recc  - Acetaminophen 1 gram po q 8 hours atc for 3 days   Opioid Recc  - oxycodone 2.5mg po q 4 hours prn moderate pain      Bowel Regimen  - senna   OOB/Pt  + anemia - continue ferrous sulfate.

## 2022-02-02 NOTE — PROGRESS NOTE ADULT - PROVIDER SPECIALTY LIST ADULT
Orthopedics
Pain Medicine
Cardiology
Internal Medicine
Pain Medicine
Pain Medicine
Internal Medicine

## 2022-02-02 NOTE — PROGRESS NOTE ADULT - ASSESSMENT
89 y/o Female S/p L Hip Hemiarthroplasty POD# 1 w/acute blood loss anemia
89 y/o Female S/p L Hip Hemiarthroplasty POD# 2
89 year old female PMH COPD, CHF, HTN, hypothyroid coming in s/p fall with left hip pain. x-ray  Impacted left basicervical fracture. Othro following.      Problem/Plan - 1:  ·  Problem: Hip fracture, left.   ·  Plan: Presenting after a fall   hip xray: left hip fracture  Ortho consulted by ED  Patient's Revised Cardiac Risk Index (RCRI) score is1  (6 % risk) and Delaney score is % risk. Patient is at moderate high risk of  adverse perioperative cardiac events undergoing intermediate  risk surgery. No further cardiac testing is needed prior to patient's surgery. No cardiac contraindications for surgery    Avoid hypovolemia    Problem/Plan - 2:  ·  Problem: Anemia.   ·  Plan: Chronic anemia   Recieved PRBC Tx      Problem/Plan - 3:  ·  Problem: HTN (hypertension).   ·  Plan: Continue all home medications.    Problem/Plan - 4:  ·  Problem: DM (diabetes mellitus).   ·  Plan: Will start on ISS  FS Q6.    Problem/Plan - 5:  ·  Problem: Hypothyroidism.   ·  Plan: On levothyroxine 25 at home  TSH  8.11   Follow free T4 and total T3  Continue home med.    Problem/Plan - 6:  ·  Problem: CHF (congestive heart failure).   ·  Plan: Continue home medications.    Problem/Plan - 7:  ·  Problem: Prophylactic measure.   ·  Plan: IMPROVE VTE Individual Risk Assessment  RISK                                                                Points  [  ] Previous VTE                                                  3  [  ] Thrombophilia                                               2  [  ] Lower limb paralysis                                      2        (unable to hold up >15 seconds)    [  ] Current Cancer                                              2         (within 6 months)  [x  ] Immobilization > 24 hrs                                1  [  ] ICU/CCU stay > 24 hours                              1  [x  ] Age > 60                                                      1  IMPROVE VTE Score _________2, -- for DVT proph  heparin sq.  
89 y/o Female with L Hip Femoral Neck Fracture
rug Utilization Report below displays all of the controlled substance prescriptions, if any, that your patient has filled in the last twelve months. The information displayed on this report is compiled from pharmacy submissions to the Department, and accurately reflects the information as submitted by the pharmacies.    This report was requested by: Campos Boles | Reference #: [id number]    Others' Prescriptions  Patient Name: Yolie Valle Date: 05/17/1927  Address: [place] [id number]: Female  Rx Written	Rx Dispensed	Drug	Quantity	Days Supply	Prescriber Name	Prescriber Nadja #	Payment Method	Dispenser  01/13/2022	01/14/2022	tramadol hcl 50 mg tablet	21	7	ByronCarlo MD	[id number]	Insurance	[id number]  11/02/2021	11/02/2021	tramadol hcl 50 mg tablet	30	10	ByronCarlo MD	[id number]	Insurance	[id number]  06/17/2021	06/17/2021	tramadol hcl 50 mg tablet	10	5	ByronCarlo MD	[id number]	Insurance	[id number]  06/14/2021	06/14/2021	tramadol hcl 50 mg tablet	3	3	Cecilia Hall MD	[id number]	Insurance	[id number]  * - Drugs marked with an asterisk are compound drugs. If the compound drug is made up of more than one controlled substance, then each controlled substance will be a separate row in the table.
rug Utilization Report below displays all of the controlled substance prescriptions, if any, that your patient has filled in the last twelve months. The information displayed on this report is compiled from pharmacy submissions to the Department, and accurately reflects the information as submitted by the pharmacies.    This report was requested by: Campos Boles | Reference #: [id number]    Others' Prescriptions  Patient Name: Yolie Valle Date: 05/17/1927  Address: [place] [id number]: Female  Rx Written	Rx Dispensed	Drug	Quantity	Days Supply	Prescriber Name	Prescriber Nadja #	Payment Method	Dispenser  01/13/2022	01/14/2022	tramadol hcl 50 mg tablet	21	7	ByronCarlo MD	[id number]	Insurance	[id number]  11/02/2021	11/02/2021	tramadol hcl 50 mg tablet	30	10	ByronCarlo MD	[id number]	Insurance	[id number]  06/17/2021	06/17/2021	tramadol hcl 50 mg tablet	10	5	ByronCarlo MD	[id number]	Insurance	[id number]  06/14/2021	06/14/2021	tramadol hcl 50 mg tablet	3	3	Cecilia Hall MD	[id number]	Insurance	[id number]  * - Drugs marked with an asterisk are compound drugs. If the compound drug is made up of more than one controlled substance, then each controlled substance will be a separate row in the table.
89yo female PMH COPD, CHF, HTN, hypothyroid coming in s/p fall with left hip pain. x-ray  Impacted left basicervical fracture. 
rug Utilization Report below displays all of the controlled substance prescriptions, if any, that your patient has filled in the last twelve months. The information displayed on this report is compiled from pharmacy submissions to the Department, and accurately reflects the information as submitted by the pharmacies.    This report was requested by: Campos Boles | Reference #: [id number]    Others' Prescriptions  Patient Name: Yolie Valle Date: 05/17/1927  Address: [place] [id number]: Female  Rx Written	Rx Dispensed	Drug	Quantity	Days Supply	Prescriber Name	Prescriber Nadja #	Payment Method	Dispenser  01/13/2022	01/14/2022	tramadol hcl 50 mg tablet	21	7	ByronCarlo MD	[id number]	Insurance	[id number]  11/02/2021	11/02/2021	tramadol hcl 50 mg tablet	30	10	ByronCarlo MD	[id number]	Insurance	[id number]  06/17/2021	06/17/2021	tramadol hcl 50 mg tablet	10	5	ByronCarlo MD	[id number]	Insurance	[id number]  06/14/2021	06/14/2021	tramadol hcl 50 mg tablet	3	3	Cecilia Hall MD	[id number]	Insurance	[id number]  * - Drugs marked with an asterisk are compound drugs. If the compound drug is made up of more than one controlled substance, then each controlled substance will be a separate row in the table.
89 year old female PMH COPD, CHF, HTN, hypothyroid coming in s/p fall with left hip pain. x-ray  Impacted left basicervical fracture. Othro following.  
89yo female PMH COPD, CHF, HTN, hypothyroid coming in s/p fall with left hip pain. x-ray  Impacted left basicervical fracture.  S/p Left Hip Hemiarthroplasty, POD #1
89 year old female PMH COPD, CHF, HTN, hypothyroid coming in s/p fall with left hip pain. x-ray  Impacted left basicervical fracture. Othro following.

## 2023-08-16 NOTE — ED ADULT TRIAGE NOTE - RESPIRATORY RATE (BREATHS/MIN)
[FreeTextEntry1] : I, Maru Valentine, acted solely as a scribe for Dr. Phillip Phan MD on this date 08/16/2023    All medical record entries made by the Scribe were at my, Dr. Phillip Phan MD., direction and personally dictated by me on 08/16/2023 . I have reviewed the chart and agree that the record accurately reflects my personal performance of the history, physical exam, assessment and plan. I have also personally directed, reviewed, and agreed with the chart. 
16

## 2024-08-04 NOTE — PROGRESS NOTE ADULT - SUBJECTIVE AND OBJECTIVE BOX
DATE OF SERVICE:  1/29/2022  Patient was seen and examined on   1/29/22  .Interim events noted.Consultant notes ,Labs,Telemetry reviewed by me    PRESENTING CC: fracture    HPI and HOSPITAL COURSE: HPI:  89 year old female PMH COPD, CHF, HTN, hypothyroid coming in s/p fall with left hip pain. Used [employer]  # [id number] but patient was  was unable to hear the patient as she was speaking very low. Called daughter, Jamila, who stated she was told her mother fell in the bathroom at the NH for which she was brought to the ED. She states her mother has chronic anemia and does not want to prolong her suffering so refuses blood transfusions. She is tired of living in pain and has made it clear to family that she wants to be DNR/DNI. Molst form in NH papers.  (28 Jan 2022 03:37)      INTERIM EVENTS:      PMH -reviewed admission note, no change since admission  Heart Failure: Acute [ ] Chronic [ ] Acute on Chronic [ ] Diastolic [ ] Systolic [ ] Combined Systolic and Diastolic[ ]  EWA[ ]  ATN[ ]  CKD I [ ] CKDII [ ] CKD III [ ] CKD IV [ ] CKD V [ ] ESRD[ ]  HTN[ ] CVA[ ] DM[ ] COPD[ ] COVID[ ] AF[ ]  PPM[ ] ICD[ ]    MEDICATIONS  (STANDING):  acetaminophen     Tablet .. 1000 milliGRAM(s) Oral every 8 hours  amLODIPine   Tablet 5 milliGRAM(s) Oral daily  budesonide  80 MICROgram(s)/formoterol 4.5 MICROgram(s) Inhaler 2 Puff(s) Inhalation two times a day  cefTRIAXone   IVPB      cefTRIAXone   IVPB 1000 milliGRAM(s) IV Intermittent every 24 hours  ferrous    sulfate Liquid 300 milliGRAM(s) Oral every 12 hours  furosemide    Tablet 40 milliGRAM(s) Oral two times a day  heparin   Injectable 5000 Unit(s) SubCutaneous every 8 hours  insulin lispro (ADMELOG) corrective regimen sliding scale   SubCutaneous every 6 hours  levothyroxine 25 MICROGram(s) Oral daily  metoprolol tartrate 25 milliGRAM(s) Oral two times a day  mirtazapine 7.5 milliGRAM(s) Oral at bedtime  senna 2 Tablet(s) Oral at bedtime    MEDICATIONS  (PRN):  oxyCODONE    IR 2.5 milliGRAM(s) Oral every 4 hours PRN Moderate Pain (4 - 6)  oxyCODONE    IR 5 milliGRAM(s) Oral every 4 hours PRN Severe Pain (7 - 10)            REVIEW OF SYSTEMS:  Constitutional: [ ] fever, [ ]weight loss,  [ ]fatigue  Eyes: [ ] visual changes  Respiratory: [ ]shortness of breath;  [ ] cough, [ ]wheezing, [ ]chills, [ ]hemoptysis  Cardiovascular: [ ] chest pain, [ ]palpitations, [ ]dizziness,  [ ]leg swelling[ ]orthopnea[ ]PND  Gastrointestinal: [ ] abdominal pain, [ ]nausea, [ ]vomiting,  [ ]diarrhea [ ]Constipation [ ]Melena  Genitourinary: [ ] dysuria, [ ] hematuria [ ]Martinez  Neurologic: [ ] headaches [ ] tremors[ ]weakness [ ]Paralysis Right[ ] Left[ ]  Skin: [ ] itching, [ ]burning, [ ] rashes  Endocrine: [ ] heat or cold intolerance  Musculoskeletal: [ ] joint pain or swelling; [ ] muscle, back, or extremity pain  Psychiatric: [ ] depression, [ ]anxiety, [ ]mood swings, or [ ]difficulty sleeping  Hematologic: [ ] easy bruising, [ ] bleeding gums    [x] All remaining systems negative except as per above.   [ ]Unable to obtain.    Vital Signs Last 24 Hrs  T(C): 37 (29 Jan 2022 05:21), Max: 37.4 (28 Jan 2022 21:35)  T(F): 98.6 (29 Jan 2022 05:21), Max: 99.3 (28 Jan 2022 21:35)  HR: 64 (29 Jan 2022 05:21) (64 - 87)  BP: 115/46 (29 Jan 2022 05:21) (108/49 - 115/46)  BP(mean): --  RR: 19 (29 Jan 2022 05:21) (18 - 20)  SpO2: 99% (29 Jan 2022 05:21) (99% - 100%)  I&O's Summary    28 Jan 2022 07:01  -  29 Jan 2022 07:00  --------------------------------------------------------  IN: 0 mL / OUT: 600 mL / NET: -600 mL        PHYSICAL EXAM:  General: No acute distress BMI-  HEENT: EOMI, PERRL  Neck: Supple, [ ] JVD  Lungs: Equal air entry bilaterally; [ ] rales [ ] wheezing [ ] rhonchi  Heart: Regular rate and rhythm; [ ] murmur   /6 [ ] systolic [ ] diastolic [ ] radiation[ ] rubs [ ]  gallops  Abdomen: Nontender, bowel sounds present  Extremities: No clubbing, cyanosis, [ ] edema [ ]Pulses  equal and intact  Nervous system:  Alert & Oriented X3, no focal deficits  Psychiatric: Normal affect  Skin: No rashes or lesions    LABS:  01-28    137  |  106  |  25<H>  ----------------------------<  134<H>  4.2   |  27  |  1.37<H>    Ca    8.3<L>      28 Jan 2022 06:23  Phos  3.9     01-28  Mg     2.4     01-28    TPro  6.9  /  Alb  2.5<L>  /  TBili  0.7  /  DBili  x   /  AST  28  /  ALT  26  /  AlkPhos  115  01-28    Creatinine Trend: 1.37<--, 1.20<--                        6.7    5.64  )-----------( 215      ( 28 Jan 2022 06:23 )             23.1     PT/INR - ( 28 Jan 2022 01:43 )   PT: 13.0 sec;   INR: 1.10 ratio         PTT - ( 28 Jan 2022 01:43 )  PTT:35.9 sec    Cardiac Enzymes:     Serum Pro-Brain Natriuretic Peptide: 685 pg/mL (01-28-22 @ 01:43)        RADIOLOGY:    ECG [my interpretation]:    TELEMETRY:Reviewed monitor tracings-    ECHO:    STRESS TEST:    CATHETERIZATION:      IMPRESSION AND PLAN:       Hematology Oncology Consult Note    Inpatient consult to Hematology/Oncology  Consult performed by: Jose Min MD  Consult ordered by: Lenin Steward MD        SUBJECTIVE:     History of Present Illness:  Patient is a 46 y.o. female with a PMH of with history of sickle cell anemia with recurrent crises requiring exchange transfusion. She has a port. SAMUEL, history of PE on apixaban admitted on 2024 as a result of sickle cell vaso occlusive crises. Pt complained of a 3 day history of pain in the ledt chest, left arm. Infectious work up has been negative. Pt has been on IV pain medications. She was noticed to have new left arm swelling 24 and left upper extremity US showed Partial deep vein thrombosis of the left internal jugular vein by the port tip. Hematology service was consulted for anticoagulation options given patient is already apixaban for PE.     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    Asthma     Depression     Hypertension     Morbid obesity     Opioid dependence 2017    Pneumonia due to Streptococcus pneumoniae 2017    Right lower lobe pneumonia 2017    Sepsis due to Streptococcus pneumoniae 2017    Sickle cell-beta thalassemia disease with pain     Trigeminal neuralgia      Past Surgical History:   Procedure Laterality Date     SECTION      TONSILLECTOMY      TUBAL LIGATION       Family History   Problem Relation Name Age of Onset    Heart disease Mother      Diabetes Mother      Heart disease Father      Breast cancer Neg Hx      Ovarian cancer Neg Hx      Colon cancer Neg Hx       Social History     Tobacco Use    Smoking status: Never    Smokeless tobacco: Never   Substance Use Topics    Alcohol use: No    Drug use: Yes     Types: Marijuana     Comment: periodically      Review  of Systems   Constitutional:  Negative for fever.   Respiratory:  Negative for cough, hemoptysis and sputum production.    Cardiovascular:  Negative for chest pain and leg swelling.   Neurological:  Negative for dizziness and headaches.     OBJECTIVE:     Vital Signs:  Temp:  [98.5 °F (36.9 °C)-98.9 °F (37.2 °C)]   Pulse:  [71-75]   Resp:  [18]   BP: ()/(54-75)   SpO2:  [93 %-99 %]     Physical Exam  HENT:      Head: Normocephalic.   Cardiovascular:      Rate and Rhythm: Normal rate and regular rhythm.   Pulmonary:      Effort: Pulmonary effort is normal.      Breath sounds: Normal breath sounds.   Abdominal:      General: Bowel sounds are normal. There is no distension.      Palpations: Abdomen is soft.   Neurological:      General: No focal deficit present.      Mental Status: She is alert.       Laboratory:  CBC:   Recent Labs   Lab 08/04/24  0432   WBC 6.39   RBC 3.64*   HGB 8.5*   HCT 25.9*      MCV 71*   MCH 23.4*   MCHC 32.8     CMP:   Recent Labs   Lab 08/04/24  0432   *   CALCIUM 9.0   ALBUMIN 3.0*   PROT 6.3      K 3.9   CO2 28      BUN 22*   CREATININE 1.4   ALKPHOS 72   ALT 15   AST 16   BILITOT 0.4     Coagulation:   Recent Labs   Lab 08/04/24  1418   APTT 28.2         Diagnostic Results:  US: Reviewed  Left UE Doppler US 08/03/24  Partial deep vein thrombosis of the left internal jugular vein by the port tip.     ASSESSMENT/PLAN:     46 y.o. female with a PMH of with history of sickle cell anemia with recurrent crises receiving standing exchange transfusion every 6 weeks. She has a port. SAMUEL, history of PE on apixaban admitted on 07/28/2024 as a result of sickle cell vaso occlusive crises. Found to have partial deep vein thrombosis of the left internal jugular vein by the port tip 08/02/24    Left upper Extremity DVT  Patient gets standing exchange transfusions every 6 weeks. She mentioned that her port was inserted in April 2024 and has been off Apixaban for about a  year due to cost. She resumed it 3 weeks after she moved to [place] from [place]. She also mentioned she had a history of PE requiring thrombectomy while on Apixaban in Nov 2023 at [facility]. It is unclear why Apixaban was continued.    Recommendations:   -Continue heparin drip  -Recommend consult to IR for removal of the port  -Recommend obtaining records for [facility] to determine long term anticoagulation as it is unclear why Apixaban was continued  -Pain control for her VOC    Discussed with Dr. Rhodes    We will continue to follow. Let us know if any questions.      Jose Min MD  Hematology/Oncology PGY-IV
